# Patient Record
Sex: MALE | URBAN - METROPOLITAN AREA
[De-identification: names, ages, dates, MRNs, and addresses within clinical notes are randomized per-mention and may not be internally consistent; named-entity substitution may affect disease eponyms.]

---

## 2020-03-24 ENCOUNTER — INPATIENT (INPATIENT)
Facility: HOSPITAL | Age: 40
LOS: 9 days | Discharge: HOME | DRG: 177 | End: 2020-04-03
Attending: INTERNAL MEDICINE | Admitting: INTERNAL MEDICINE
Payer: MEDICAID

## 2020-03-24 VITALS
RESPIRATION RATE: 22 BRPM | SYSTOLIC BLOOD PRESSURE: 133 MMHG | TEMPERATURE: 103 F | HEART RATE: 87 BPM | DIASTOLIC BLOOD PRESSURE: 72 MMHG | OXYGEN SATURATION: 91 %

## 2020-03-24 DIAGNOSIS — R68.89 OTHER GENERAL SYMPTOMS AND SIGNS: ICD-10-CM

## 2020-03-24 DIAGNOSIS — Z98.890 OTHER SPECIFIED POSTPROCEDURAL STATES: Chronic | ICD-10-CM

## 2020-03-24 LAB
ALBUMIN SERPL ELPH-MCNC: 3.6 G/DL — SIGNIFICANT CHANGE UP (ref 3.3–5)
ALP SERPL-CCNC: 58 U/L — SIGNIFICANT CHANGE UP (ref 40–120)
ALT FLD-CCNC: 25 U/L — SIGNIFICANT CHANGE UP (ref 10–45)
ANION GAP SERPL CALC-SCNC: 15 MMOL/L — SIGNIFICANT CHANGE UP (ref 5–17)
APTT BLD: 35 SEC — SIGNIFICANT CHANGE UP (ref 27.5–36.3)
AST SERPL-CCNC: 33 U/L — SIGNIFICANT CHANGE UP (ref 10–40)
BASOPHILS # BLD AUTO: 0.01 K/UL — SIGNIFICANT CHANGE UP (ref 0–0.2)
BASOPHILS NFR BLD AUTO: 0.2 % — SIGNIFICANT CHANGE UP (ref 0–2)
BILIRUB SERPL-MCNC: 0.6 MG/DL — SIGNIFICANT CHANGE UP (ref 0.2–1.2)
BUN SERPL-MCNC: 12 MG/DL — SIGNIFICANT CHANGE UP (ref 7–23)
CALCIUM SERPL-MCNC: 8 MG/DL — LOW (ref 8.4–10.5)
CHLORIDE SERPL-SCNC: 101 MMOL/L — SIGNIFICANT CHANGE UP (ref 96–108)
CO2 SERPL-SCNC: 18 MMOL/L — LOW (ref 22–31)
CREAT SERPL-MCNC: 0.81 MG/DL — SIGNIFICANT CHANGE UP (ref 0.5–1.3)
EOSINOPHIL # BLD AUTO: 0.01 K/UL — SIGNIFICANT CHANGE UP (ref 0–0.5)
EOSINOPHIL NFR BLD AUTO: 0.2 % — SIGNIFICANT CHANGE UP (ref 0–6)
GLUCOSE SERPL-MCNC: 140 MG/DL — HIGH (ref 70–99)
HCT VFR BLD CALC: 38.1 % — LOW (ref 39–50)
HGB BLD-MCNC: 12.9 G/DL — LOW (ref 13–17)
IMM GRANULOCYTES NFR BLD AUTO: 0.4 % — SIGNIFICANT CHANGE UP (ref 0–1.5)
INR BLD: 1.29 RATIO — HIGH (ref 0.88–1.16)
LACTATE BLDV-MCNC: 1.7 MMOL/L — SIGNIFICANT CHANGE UP (ref 0.7–2)
LYMPHOCYTES # BLD AUTO: 0.5 K/UL — LOW (ref 1–3.3)
LYMPHOCYTES # BLD AUTO: 9.7 % — LOW (ref 13–44)
MCHC RBC-ENTMCNC: 30.6 PG — SIGNIFICANT CHANGE UP (ref 27–34)
MCHC RBC-ENTMCNC: 33.9 GM/DL — SIGNIFICANT CHANGE UP (ref 32–36)
MCV RBC AUTO: 90.3 FL — SIGNIFICANT CHANGE UP (ref 80–100)
MONOCYTES # BLD AUTO: 0.51 K/UL — SIGNIFICANT CHANGE UP (ref 0–0.9)
MONOCYTES NFR BLD AUTO: 9.8 % — SIGNIFICANT CHANGE UP (ref 2–14)
NEUTROPHILS # BLD AUTO: 4.13 K/UL — SIGNIFICANT CHANGE UP (ref 1.8–7.4)
NEUTROPHILS NFR BLD AUTO: 79.7 % — HIGH (ref 43–77)
NRBC # BLD: 0 /100 WBCS — SIGNIFICANT CHANGE UP (ref 0–0)
PLATELET # BLD AUTO: 123 K/UL — LOW (ref 150–400)
POTASSIUM SERPL-MCNC: 4 MMOL/L — SIGNIFICANT CHANGE UP (ref 3.5–5.3)
POTASSIUM SERPL-SCNC: 4 MMOL/L — SIGNIFICANT CHANGE UP (ref 3.5–5.3)
PROT SERPL-MCNC: 6.7 G/DL — SIGNIFICANT CHANGE UP (ref 6–8.3)
PROTHROM AB SERPL-ACNC: 15 SEC — HIGH (ref 10–12.9)
RBC # BLD: 4.22 M/UL — SIGNIFICANT CHANGE UP (ref 4.2–5.8)
RBC # FLD: 12.8 % — SIGNIFICANT CHANGE UP (ref 10.3–14.5)
SODIUM SERPL-SCNC: 134 MMOL/L — LOW (ref 135–145)
WBC # BLD: 5.18 K/UL — SIGNIFICANT CHANGE UP (ref 3.8–10.5)
WBC # FLD AUTO: 5.18 K/UL — SIGNIFICANT CHANGE UP (ref 3.8–10.5)

## 2020-03-24 PROCEDURE — 71250 CT THORAX DX C-: CPT | Mod: 26

## 2020-03-24 PROCEDURE — 93010 ELECTROCARDIOGRAM REPORT: CPT

## 2020-03-24 PROCEDURE — 99223 1ST HOSP IP/OBS HIGH 75: CPT

## 2020-03-24 PROCEDURE — 99285 EMERGENCY DEPT VISIT HI MDM: CPT

## 2020-03-24 PROCEDURE — 71045 X-RAY EXAM CHEST 1 VIEW: CPT | Mod: 26

## 2020-03-24 RX ORDER — ACETAMINOPHEN 500 MG
975 TABLET ORAL ONCE
Refills: 0 | Status: COMPLETED | OUTPATIENT
Start: 2020-03-24 | End: 2020-03-24

## 2020-03-24 RX ADMIN — Medication 975 MILLIGRAM(S): at 21:35

## 2020-03-24 NOTE — ED ADULT NURSE NOTE - NSFALLRSKINDICATORS_ED_ALL_ED
Emergency Department  [address] [facility] [address]  Phone:  [phone]  Fax:  [phone]                                    Ron Gilmore   MRN: [id number]    Department:   Emergency Department   Date of Visit:  6/27/2019           After Visit Summary Signature Page    I have received my discharge instructions, and my questions have been answered. I have discussed any challenges I see with this plan with the nurse or doctor.    ..........................................................................................................................................  Patient/Patient Representative Signature      ..........................................................................................................................................  Patient Representative Print Name and Relationship to Patient    ..................................................               ................................................  Date                                   Time    ..........................................................................................................................................  Reviewed by Signature/Title    ...................................................              ..............................................  Date                                               Time          22EPIC Rev 08/18       
no

## 2020-03-24 NOTE — ED ADULT NURSE NOTE - CHPI ED NUR SYMPTOMS NEG
no chest pain/no edema/no hemoptysis/no diaphoresis/no wheezing/no headache/no cough/no body aches/no chills

## 2020-03-24 NOTE — ED PROVIDER NOTE - CARE PLAN
Principal Discharge DX:	Fever Principal Discharge DX:	[facility]

## 2020-03-24 NOTE — H&P ADULT - NSHPSOCIALHISTORY_GEN_ALL_CORE
Lives with daughter, Laquita  Former smoker quit 3-4 months ago. Smoked since the age of 17 <1ppd  Denies EtOH  Denies illicit drug use

## 2020-03-24 NOTE — H&P ADULT - ATTENDING COMMENTS
Patient was previously unknown to me. Patient was assigned to me by hospitalist in charge. My involvement in this case consisted only of the initial history, physical and management plan. Primary medicine day team to assume care in AM and thereafter. Case discussed in detail with overnight medicine NP/DUNCAN Reddy [id number]

## 2020-03-24 NOTE — ED PROVIDER NOTE - ATTENDING CONTRIBUTION TO CARE
Attending MD Sheffield:   I personally have seen and examined this patient.  Physician assistant note reviewed and agree on plan of care and except where noted.  See below for details.     Seen in [facility]/[place] [facility], interviewed in [place]  Laquita, daughter, [phone], [place] speaking    79M with no reported PMH, PSH ventral hernia repair presents to the ED with fever for three weeks.  Denies cough, chest pain, abdominal pain, nausea, vomiting, diarrhea, urinary complaints.  Denies travel, known COVID contacts.  Reports quit tobacco 3 months ago.  Denies EtOH, denies drugs.  On exam, ill appearing, head NCAT, PERRL, ranging neck freely, lungs CTAB with good inspiratory effort,  +S1S2, no m/r/g, abdomen soft with +BS, NT, +well healed midline surgical scar in mid upper abdomen, no CVAT, moving all extremities with 5/5 strength; A/P: 79M with fever for three weeks, will proceed with infectious work up but given prevalence of COVID 19 at this time, will obtain labs, Cx, UA, UrCx, will swab for COVID, CXR, will give IVFs, Tylenol, reassess    Spoke with daughter, Laquita, who corroborates history given by patient, no other symptoms other than fever for three weeks Attending MD Sheffield:   I personally have seen and examined this patient.  Physician assistant note reviewed and agree on plan of care and except where noted.  See below for details.     Seen in [facility]/[place] 25, interviewed in [place]  Laquita, daughter, [phone], Citizen [place] speaking    79M with no reported PMH, PSH ventral hernia repair presents to the ED with fever for three weeks.  Denies cough, chest pain, abdominal pain, nausea, vomiting, diarrhea, urinary complaints.  Denies travel, known COVID contacts.  Reports quit tobacco 3 months ago.  Denies EtOH, denies drugs.  On exam, ill appearing, head NCAT, PERRL, ranging neck freely, lungs diffuse crackles with good inspiratory effort, sat'ing low 90s while resting in stretcher, no retractions, no accessory muscle use,+S1S2, no m/r/g, abdomen soft with +BS, NT, +well healed midline surgical scar in mid upper abdomen, no CVAT, moving all extremities with 5/5 strength; A/P: 79M with fever for three weeks, will proceed with infectious work up but given prevalence of COVID 19 at this time, will obtain labs, Cx, UA, UrCx, will swab for COVID, CXR, will give IVFs, Tylenol, reassess    Spoke with daughter, Laquita, who corroborates history given by patient, no other symptoms other than fever for three weeks

## 2020-03-24 NOTE — H&P ADULT - ASSESSMENT
80 yo man with no reported PMH presents from home in setting of 3 weeks of fever and malaise with CXR notable for diffuse bilateral opacities concerning for PNA

## 2020-03-24 NOTE — H&P ADULT - NSHPPHYSICALEXAM_GEN_ALL_CORE
Vital Signs Last 24 Hrs  T(C): 38.7 (24 Mar 2020 21:40), Max: 39.5 (24 Mar 2020 20:33)  T(F): 101.6 (24 Mar 2020 21:40), Max: 103.1 (24 Mar 2020 20:33)  HR: 80 (24 Mar 2020 23:33) (80 - 87)  BP: 119/65 (24 Mar 2020 23:33) (119/65 - 145/69)  BP(mean): --  RR: 20 (24 Mar 2020 23:33) (20 - 25)  SpO2: 94% (24 Mar 2020 23:33) (91% - 94%)      PHYSICAL EXAM:  GENERAL: NAD, well-groomed, well-developed  HEAD:  Atraumatic, Normocephalic  EYES: EOMI, PERRLA, conjunctiva and sclera clear  NECK: Supple, No JVD  NERVOUS SYSTEM:  Alert & Oriented X3, Good concentration; Motor Strength 5/5 B/L upper and lower extremities. Mild resting tremors  CHEST/LUNG: Speaking in full sentences, respirations non labored. Scant rales  HEART: Regular rate and rhythm +S1 S2; No murmurs, rubs, or gallops  ABDOMEN: Soft, Nontender, Nondistended; Bowel sounds present  EXTREMITIES:  2+ Peripheral Pulses, No clubbing, cyanosis, or edema  LYMPH: No cervical or supraclavicular lymphadenopathy noted  SKIN: Warm. No rashes or lesions
yes

## 2020-03-24 NOTE — ED PROVIDER NOTE - PHYSICAL EXAMINATION
GEN: Pt in NAD, A&O x3.  PSYCH: Affect appropriate.  EYES: Sclera white w/o injection.   ENT: Head NCAT. Nose w/o deformity. No auricular TTP. MMM, uvula midline. Neck supple FROM.   RESP: No chest wall tenderness, faint rales b/l no wheezes or rhonchi.  CARDIAC: RRR, clear distinct S1, S2, no appreciable murmurs.  ABD: Abdomen with midline vertical incisional scar. Abd soft, non-tender. No CVAT b/l.  VASC: 2+ radial and dorsalis pedis pulses b/l. No edema or calf tenderness.  SKIN: No rashes on the trunk. GEN: Pt in NAD, A&O x3.  PSYCH: Affect appropriate.  EYES: Sclera white w/o injection.   ENT: Head NCAT. Nose w/o deformity. No auricular TTP. MMM, uvula midline. Neck supple FROM, negative kernig and brudzinski.   RESP: No chest wall tenderness, faint rales b/l no wheezes or rhonchi.  CARDIAC: RRR, clear distinct S1, S2, no appreciable murmurs.  ABD: Abdomen with midline vertical incisional scar. Abd soft, non-tender. No CVAT b/l.  VASC: 2+ radial and dorsalis pedis pulses b/l. No edema or calf tenderness.  SKIN: No rashes on the trunk.

## 2020-03-24 NOTE — ED ADULT NURSE NOTE - OBJECTIVE STATEMENT
79y old male no PMH walk in from triage c/o fever. A&Ox? As per patients daughter, patient has had fevers for past two weeks, taken dayquil last dose taken this morning, daughter denies that patient has had fever/chills, n/v/d, SOB, CP, recent sick contacts at home, numbness/tingling, urinary s/s. 79y old male no PMH walk in from triage c/o fever. A&Ox? As per patients daughter, patient has had fevers for past three weeks, taken dayquil last dose taken this morning, daughter denies that patient has had fever/chills, n/v/d, SOB, CP, recent sick contacts at home, numbness/tingling, urinary s/s. Patient skin is warm and intact, skin on face is sloughing and red, airway patent, equal chest rise present, rales auscultated b/t, no edema present. IV inserted, medication administered as per MD order.

## 2020-03-24 NOTE — H&P ADULT - NSHPLABSRESULTS_GEN_ALL_CORE
Personally reviewed EKG: SR 82 bpm QTc 446 no ST segment changes    Personally reviewed labs and noted in detail below: lymphopenia    Personally reviewed imaging  < from: Xray Chest 1 View AP/PA (03.24.20 @ 22:42) >    ******PRELIMINARY REPORT******          INTERPRETATION:  Bilateral patchy opacities, likely representing multifocal pneumonia (differential considerations include viral pneumonia, such as COVID-19).    ******PRELIMINARY REPORT******      < end of copied text >

## 2020-03-24 NOTE — H&P ADULT - PROBLEM SELECTOR PLAN 1
Suspected COVID-19 in setting of lymphopenia and CXR findings  However must rule out other sources given chronicity of symptoms  Obtain UA and Urine culture  F/U Urine culture  Given CXR findings of PNA: empiric antibiotics with Ceftriaxone and Azithromycin

## 2020-03-24 NOTE — H&P ADULT - HISTORY OF PRESENT ILLNESS
78 yo man with no reported PMH presents from home in setting of 3 weeks of fever and malaise. Patient states that the fevers were not high. Intermittent sweats. Denies chills. Denies rhinorrhea, nasal congestion, sore throat, cough.  Denies CP, SOB, palpitations. Denies abdominal pain, nausea, emesis, diarrhea, dysuria. Denies sick contacts, but lives with daughter, Laquita, who has been having similar symptoms. Denies recent travel. Denies known COVID contacts. 78 yo man with no reported PMH presents from home in setting of 3 weeks of fever and malaise. Patient states that the fevers were not high. Intermittent sweats. Denies chills. Denies rhinorrhea, nasal congestion, sore throat, cough. Denies sensation of swollen glands along the neck.  Denies CP, SOB, palpitations. Denies abdominal pain, nausea, emesis, diarrhea, dysuria. Denies sick contacts, but lives with daughter, Laquita, who has been having similar symptoms. Denies recent travel. Denies known COVID contacts. Has been taking Tylenol and Dayquil

## 2020-03-24 NOTE — H&P ADULT - PROBLEM SELECTOR PLAN 2
- COVID19 PCR collected and is pending/negative with plan for infection control to determine retest/positive  - Chest imaging consistent with multifocal pneumonia  - Relative Hypoxia in the ED: Continue with supplemental oxygen with goal SpO2>92, if requires escalate to BiPAP and avoid BiPAP/High Flow Nasal Cannula per institution policy given risk of aerosolization  - empiric antibiotics for superimposed bacterial infection given due to clinical status  - obtain cbc with diff, CMP w/ lytes procalcitonin, ESR, CRP  - If COVID19 positive collect LDH, Ferritin, Lactate, T-cell subset, CK, G6PD  - monitor EKG for QTc prolongation  - isolation precautions - COVID19 PCR collected and is pending/negative with plan for infection control to determine retest/positive  - Chest imaging consistent with multifocal pneumonia  - Relative Hypoxia in the ED: Continue with supplemental oxygen with goal SpO2>92, if requires escalate to NRB and avoid BiPAP/High Flow Nasal Cannula per institution policy given risk of aerosolization  - empiric antibiotics for superimposed bacterial infection given due to clinical status  - obtain cbc with diff, CMP w/ lytes procalcitonin, ESR, CRP  - If COVID19 positive collect LDH, Ferritin, T-cell subset, CK, G6PD  - monitor EKG for QTc prolongation  - isolation precautions

## 2020-03-24 NOTE — H&P ADULT - NSHPREVIEWOFSYSTEMS_GEN_ALL_CORE
REVIEW OF SYSTEMS:  CONSTITUTIONAL: See HPI  EYES: No eye pain or discharge  ENMT:  See HPI  RESPIRATORY: No cough, wheezing, or shortness of breath  CARDIOVASCULAR: No chest pain, palpitations, or leg swelling  GASTROINTESTINAL: No abdominal pain, nausea, vomiting, diarrhea or constipation. No melena or hematochezia.  GENITOURINARY: No dysuria, frequency, hematuria, or incontinence  NEUROLOGICAL: No headaches, loss of strength, numbness, or tremors  SKIN: +Dry skin  LYMPH NODES: No enlarged glands  MUSCULOSKELETAL: No joint pain or swelling; No muscle, back, or extremity pain  ALLERGY AND IMMUNOLOGIC: No reaction to medications

## 2020-03-24 NOTE — ED ADULT NURSE NOTE - NURSING NEURO LEVEL OF CONSCIOUSNESS
This was the same medication she was on before, I'm not sure why her copay is now higher. Unfortunately, most combo pills tend to be more expensive because they are more convenient. She can discuss with her pharmacist if there are combo pills that her insurance will cover.  I am recommending she takes the individual pills in the meantime which should be cheaper- please confirm with her pharmacy that these are covered. Her blood pressure was well controlled on losartan- HCTZ. Scripts are pending.    alert and awake

## 2020-03-24 NOTE — ED PROVIDER NOTE - PROGRESS NOTE DETAILS
This patient is a non [facility] employee and seen in the Emergency department.   Staff did use appropriate PPE.

## 2020-03-25 DIAGNOSIS — R68.89 OTHER GENERAL SYMPTOMS AND SIGNS: ICD-10-CM

## 2020-03-25 DIAGNOSIS — Z29.9 ENCOUNTER FOR PROPHYLACTIC MEASURES, UNSPECIFIED: ICD-10-CM

## 2020-03-25 DIAGNOSIS — Z02.9 ENCOUNTER FOR ADMINISTRATIVE EXAMINATIONS, UNSPECIFIED: ICD-10-CM

## 2020-03-25 DIAGNOSIS — R50.9 FEVER, UNSPECIFIED: ICD-10-CM

## 2020-03-25 DIAGNOSIS — J96.01 ACUTE RESPIRATORY FAILURE WITH HYPOXIA: ICD-10-CM

## 2020-03-25 DIAGNOSIS — B34.2 CORONAVIRUS INFECTION, UNSPECIFIED: ICD-10-CM

## 2020-03-25 LAB
APPEARANCE UR: CLEAR — SIGNIFICANT CHANGE UP
BILIRUB UR-MCNC: NEGATIVE — SIGNIFICANT CHANGE UP
CK SERPL-CCNC: 141 U/L — SIGNIFICANT CHANGE UP (ref 30–200)
COLOR SPEC: YELLOW — SIGNIFICANT CHANGE UP
D DIMER BLD IA.RAPID-MCNC: 226 NG/ML DDU — SIGNIFICANT CHANGE UP
DIFF PNL FLD: NEGATIVE — SIGNIFICANT CHANGE UP
FERRITIN SERPL-MCNC: 888 NG/ML — HIGH (ref 30–400)
GLUCOSE UR QL: NEGATIVE — SIGNIFICANT CHANGE UP
KETONES UR-MCNC: NEGATIVE — SIGNIFICANT CHANGE UP
LDH SERPL L TO P-CCNC: 298 U/L — HIGH (ref 50–242)
LEUKOCYTE ESTERASE UR-ACNC: NEGATIVE — SIGNIFICANT CHANGE UP
NITRITE UR-MCNC: NEGATIVE — SIGNIFICANT CHANGE UP
PH UR: 6.5 — SIGNIFICANT CHANGE UP (ref 5–8)
PROT UR-MCNC: ABNORMAL
SARS-COV-2 RNA SPEC QL NAA+PROBE: DETECTED
SP GR SPEC: 1.03 — HIGH (ref 1.01–1.02)
UROBILINOGEN FLD QL: NEGATIVE — SIGNIFICANT CHANGE UP

## 2020-03-25 PROCEDURE — 99233 SBSQ HOSP IP/OBS HIGH 50: CPT

## 2020-03-25 RX ORDER — ALBUTEROL 90 UG/1
2 AEROSOL, METERED ORAL EVERY 6 HOURS
Refills: 0 | Status: DISCONTINUED | OUTPATIENT
Start: 2020-03-25 | End: 2020-03-29

## 2020-03-25 RX ORDER — INFLUENZA VIRUS VACCINE 15; 15; 15; 15 UG/.5ML; UG/.5ML; UG/.5ML; UG/.5ML
0.5 SUSPENSION INTRAMUSCULAR ONCE
Refills: 0 | Status: DISCONTINUED | OUTPATIENT
Start: 2020-03-25 | End: 2020-04-03

## 2020-03-25 RX ORDER — CEFTRIAXONE 500 MG/1
1000 INJECTION, POWDER, FOR SOLUTION INTRAMUSCULAR; INTRAVENOUS ONCE
Refills: 0 | Status: COMPLETED | OUTPATIENT
Start: 2020-03-25 | End: 2020-03-25

## 2020-03-25 RX ORDER — ENOXAPARIN SODIUM 100 MG/ML
40 INJECTION SUBCUTANEOUS EVERY 24 HOURS
Refills: 0 | Status: DISCONTINUED | OUTPATIENT
Start: 2020-03-25 | End: 2020-04-03

## 2020-03-25 RX ORDER — AZITHROMYCIN 500 MG/1
TABLET, FILM COATED ORAL
Refills: 0 | Status: DISCONTINUED | OUTPATIENT
Start: 2020-03-25 | End: 2020-03-25

## 2020-03-25 RX ORDER — AZITHROMYCIN 500 MG/1
500 TABLET, FILM COATED ORAL ONCE
Refills: 0 | Status: COMPLETED | OUTPATIENT
Start: 2020-03-25 | End: 2020-03-25

## 2020-03-25 RX ORDER — CEFTRIAXONE 500 MG/1
INJECTION, POWDER, FOR SOLUTION INTRAMUSCULAR; INTRAVENOUS
Refills: 0 | Status: DISCONTINUED | OUTPATIENT
Start: 2020-03-25 | End: 2020-03-25

## 2020-03-25 RX ORDER — AZITHROMYCIN 500 MG/1
500 TABLET, FILM COATED ORAL DAILY
Refills: 0 | Status: DISCONTINUED | OUTPATIENT
Start: 2020-03-25 | End: 2020-03-26

## 2020-03-25 RX ORDER — ACETAMINOPHEN 500 MG
650 TABLET ORAL EVERY 6 HOURS
Refills: 0 | Status: DISCONTINUED | OUTPATIENT
Start: 2020-03-25 | End: 2020-04-03

## 2020-03-25 RX ADMIN — Medication 650 MILLIGRAM(S): at 10:31

## 2020-03-25 RX ADMIN — CEFTRIAXONE 100 MILLIGRAM(S): 500 INJECTION, POWDER, FOR SOLUTION INTRAMUSCULAR; INTRAVENOUS at 02:20

## 2020-03-25 RX ADMIN — AZITHROMYCIN 250 MILLIGRAM(S): 500 TABLET, FILM COATED ORAL at 01:00

## 2020-03-25 RX ADMIN — ENOXAPARIN SODIUM 40 MILLIGRAM(S): 100 INJECTION SUBCUTANEOUS at 12:22

## 2020-03-25 NOTE — PROGRESS NOTE ADULT - PROBLEM SELECTOR PLAN 1
Covid-19 POS   eligible for study - f/u ID  d/c antibiotics - first ctx, then azithro once alternative mgmt identified - monitor QTc while on azithro  start plaquenil if otherwise not enrolled in a study  O2 mgmt as below - wean as tolerated Covid-19 POS   eligible for study - f/u ID  d/c antibiotics - first ctx, then azithro once alternative mgmt identified - monitor QTc while on azithro  start plaquenil if otherwise not enrolled in a study  O2 mgmt as below - wean as tolerated    f/u G6PD but can start plaquenil if recommended  trend inflammatory markers q48hrs

## 2020-03-25 NOTE — PROGRESS NOTE ADULT - SUBJECTIVE AND OBJECTIVE BOX
[facility] Division of Hospital Medicine  Salazar Dewey MD  Pager (M-F, 8A-5P): [phone]  Other Times:  [phone]    Patient is a 79y old  Male who presents with a chief complaint of fever (24 Mar 2020 23:26)    SUBJECTIVE / OVERNIGHT EVENTS:   ADDITIONAL REVIEW OF SYSTEMS:    MEDICATIONS  (STANDING):  azithromycin  IVPB      cefTRIAXone   IVPB      enoxaparin Injectable 40 milliGRAM(s) SubCutaneous every 24 hours  influenza   Vaccine 0.5 milliLiter(s) IntraMuscular once    MEDICATIONS  (PRN):  acetaminophen   Tablet .. 650 milliGRAM(s) Oral every 6 hours PRN Temp greater or equal to 38C (100.4F), Mild Pain (1 - 3)  ALBUTerol    90 MICROgram(s) HFA Inhaler 2 Puff(s) Inhalation every 6 hours PRN Shortness of Breath and/or Wheezing      CAPILLARY BLOOD GLUCOSE    I&O's Summary    24 Mar 2020 07:01  -  25 Mar 2020 07:00  --------------------------------------------------------  IN: 0 mL / OUT: 300 mL / NET: -300 mL    25 Mar 2020 07:01  -  25 Mar 2020 15:48  --------------------------------------------------------  IN: 480 mL / OUT: 250 mL / NET: 230 mL        PHYSICAL EXAM:  Vital Signs Last 24 Hrs  T(C): 37.2 (25 Mar 2020 14:15), Max: 39.5 (24 Mar 2020 20:33)  T(F): 98.9 (25 Mar 2020 14:15), Max: 103.1 (24 Mar 2020 20:33)  HR: 84 (25 Mar 2020 13:07) (55 - 87)  BP: 126/67 (25 Mar 2020 13:07) (111/61 - 145/69)  BP(mean): 77 (25 Mar 2020 02:47) (77 - 77)  RR: 18 (25 Mar 2020 13:07) (18 - 25)  SpO2: 94% (25 Mar 2020 13:07) (91% - 96%)  CONSTITUTIONAL: NAD, well-developed, well-groomed  EYES: conjunctiva and sclera clear  ENMT: Moist oral mucosa, no pharyngeal injection or exudates; normal dentition  NECK: Supple, no palpable masses; no thyromegaly  RESPIRATORY: Normal respiratory effort; lungs are clear to auscultation bilaterally  CARDIOVASCULAR: Regular rate and rhythm, no murmur; No lower extremity edema; Peripheral pulses are 2+ bilaterally  ABDOMEN: Nontender to palpation, normoactive bowel sounds, no rebound/guarding; No hepatosplenomegaly  PSYCH: calm  NEUROLOGY: AOx3 nonfocal  SKIN: No rashes; no palpable lesions    LABS:                        12.9   5.18  )-----------( 123      ( 24 Mar 2020 21:47 )             38.1     03-24    134<L>  |  101  |  12  ----------------------------<  140<H>  4.0   |  18<L>  |  0.81    Ca    8.0<L>      24 Mar 2020 21:47    TPro  6.7  /  Alb  3.6  /  TBili  0.6  /  DBili  x   /  AST  33  /  ALT  25  /  AlkPhos  58  03-24    PT/INR - ( 24 Mar 2020 21:47 )   PT: 15.0 sec;   INR: 1.29 ratio         PTT - ( 24 Mar 2020 21:47 )  PTT:35.0 sec  CARDIAC MARKERS ( 25 Mar 2020 06:29 )  x     / x     / 141 U/L / x     / x          Urinalysis Basic - ( 25 Mar 2020 12:33 )    Color: Yellow / Appearance: Clear / S.027 / pH: x  Gluc: x / Ketone: Negative  / Bili: Negative / Urobili: Negative   Blood: x / Protein: 30 mg/dL / Nitrite: Negative   Leuk Esterase: Negative / RBC: 1 /hpf / WBC 0 /HPF   Sq Epi: x / Non Sq Epi: 1 / Bacteria: Negative          RADIOLOGY & ADDITIONAL TESTS:  Results Reviewed:   Imaging Personally Reviewed:  Electrocardiogram Personally Reviewed:    COORDINATION OF CARE:  Care Discussed with Consultants/Other Providers [Y/N]:  Prior or Outpatient Records Reviewed [Y/N]: [facility] Division of Hospital Medicine  Salazar Dewey MD  Pager (M-F, 8A-5P): [phone]  Other Times:  [phone]   phone declined  Patient is a 79y old  Male who presents with a chief complaint of fever (24 Mar 2020 23:26)    SUBJECTIVE / OVERNIGHT EVENTS: some dyspnea. minimal other complaints. poor appetite.   ADDITIONAL REVIEW OF SYSTEMS:    MEDICATIONS  (STANDING):  azithromycin  IVPB      cefTRIAXone   IVPB      enoxaparin Injectable 40 milliGRAM(s) SubCutaneous every 24 hours  influenza   Vaccine 0.5 milliLiter(s) IntraMuscular once    MEDICATIONS  (PRN):  acetaminophen   Tablet .. 650 milliGRAM(s) Oral every 6 hours PRN Temp greater or equal to 38C (100.4F), Mild Pain (1 - 3)  ALBUTerol    90 MICROgram(s) HFA Inhaler 2 Puff(s) Inhalation every 6 hours PRN Shortness of Breath and/or Wheezing      CAPILLARY BLOOD GLUCOSE    I&O's Summary    24 Mar 2020 07:  -  25 Mar 2020 07:00  --------------------------------------------------------  IN: 0 mL / OUT: 300 mL / NET: -300 mL    25 Mar 2020 07:01  -  25 Mar 2020 15:48  --------------------------------------------------------  IN: 480 mL / OUT: 250 mL / NET: 230 mL        PHYSICAL EXAM:  Vital Signs Last 24 Hrs  T(C): 37.2 (25 Mar 2020 14:15), Max: 39.5 (24 Mar 2020 20:33)  T(F): 98.9 (25 Mar 2020 14:15), Max: 103.1 (24 Mar 2020 20:33)  HR: 84 (25 Mar 2020 13:07) (55 - 87)  BP: 126/67 (25 Mar 2020 13:07) (111/61 - 145/69)  BP(mean): 77 (25 Mar 2020 02:47) (77 - 77)  RR: 18 (25 Mar 2020 13:07) (18 - 25)  SpO2: 94% (25 Mar 2020 13:07) (91% - 96%)  GENERAL: NAD, well-groomed, well-developed  NECK: Supple, No JVD  NERVOUS SYSTEM:  Alert & Oriented X3, Good concentration; Motor Strength 5/5 B/L upper and lower extremities. Mild resting tremors  CHEST/LUNG: Speaking in full sentences, respirations non labored. Scant rales  HEART: Regular rate and rhythm +S1 S2; No murmurs, rubs, or gallops  ABDOMEN: Soft, Nontender, Nondistended; Bowel sounds present  EXTREMITIES:  2+ Peripheral Pulses, No clubbing, cyanosis, or edema  SKIN: Warm. No rashes or lesions    LABS:                        12.9   5.18  )-----------( 123      ( 24 Mar 2020 21:47 )             38.1     03-24    134<L>  |  101  |  12  ----------------------------<  140<H>  4.0   |  18<L>  |  0.81    Ca    8.0<L>      24 Mar 2020 21:47    TPro  6.7  /  Alb  3.6  /  TBili  0.6  /  DBili  x   /  AST  33  /  ALT  25  /  AlkPhos  58  03-24    PT/INR - ( 24 Mar 2020 21:47 )   PT: 15.0 sec;   INR: 1.29 ratio         PTT - ( 24 Mar 2020 21:47 )  PTT:35.0 sec  CARDIAC MARKERS ( 25 Mar 2020 06:29 )  x     / x     / 141 U/L / x     / x          Urinalysis Basic - ( 25 Mar 2020 12:33 )    Color: Yellow / Appearance: Clear / S.027 / pH: x  Gluc: x / Ketone: Negative  / Bili: Negative / Urobili: Negative   Blood: x / Protein: 30 mg/dL / Nitrite: Negative   Leuk Esterase: Negative / RBC: 1 /hpf / WBC 0 /HPF   Sq Epi: x / Non Sq Epi: 1 / Bacteria: Negative    RADIOLOGY & ADDITIONAL TESTS:  Results Reviewed:   Imaging Personally Reviewed:   Electrocardiogram Personally Reviewed:    COORDINATION OF CARE:  Care Discussed with Consultants/Other Providers [Y/N]: HEIDI rhodes eligibility for research study and covid mgmt  Prior or Outpatient Records Reviewed [Y/N]:

## 2020-03-25 NOTE — PROGRESS NOTE ADULT - ASSESSMENT
80 yo man with no reported PMH presents from home in setting of 3 weeks of fever and malaise with CXR notable for diffuse bilateral opacities concerning for PNA Covid+

## 2020-03-26 LAB
ALBUMIN SERPL ELPH-MCNC: 3.3 G/DL — SIGNIFICANT CHANGE UP (ref 3.3–5)
ALP SERPL-CCNC: 57 U/L — SIGNIFICANT CHANGE UP (ref 40–120)
ALT FLD-CCNC: 21 U/L — SIGNIFICANT CHANGE UP (ref 10–45)
ANION GAP SERPL CALC-SCNC: 13 MMOL/L — SIGNIFICANT CHANGE UP (ref 5–17)
AST SERPL-CCNC: 33 U/L — SIGNIFICANT CHANGE UP (ref 10–40)
BASOPHILS # BLD AUTO: 0.01 K/UL — SIGNIFICANT CHANGE UP (ref 0–0.2)
BASOPHILS NFR BLD AUTO: 0.1 % — SIGNIFICANT CHANGE UP (ref 0–2)
BILIRUB SERPL-MCNC: 0.6 MG/DL — SIGNIFICANT CHANGE UP (ref 0.2–1.2)
BUN SERPL-MCNC: 15 MG/DL — SIGNIFICANT CHANGE UP (ref 7–23)
CALCIUM SERPL-MCNC: 8.1 MG/DL — LOW (ref 8.4–10.5)
CHLORIDE SERPL-SCNC: 99 MMOL/L — SIGNIFICANT CHANGE UP (ref 96–108)
CO2 SERPL-SCNC: 21 MMOL/L — LOW (ref 22–31)
CREAT SERPL-MCNC: 0.75 MG/DL — SIGNIFICANT CHANGE UP (ref 0.5–1.3)
CRP SERPL-MCNC: 9.35 MG/DL — HIGH (ref 0–0.4)
CULTURE RESULTS: SIGNIFICANT CHANGE UP
EOSINOPHIL # BLD AUTO: 0.01 K/UL — SIGNIFICANT CHANGE UP (ref 0–0.5)
EOSINOPHIL NFR BLD AUTO: 0.1 % — SIGNIFICANT CHANGE UP (ref 0–6)
ERYTHROCYTE [SEDIMENTATION RATE] IN BLOOD: 81 MM/HR — HIGH (ref 0–20)
GLUCOSE SERPL-MCNC: 124 MG/DL — HIGH (ref 70–99)
HCT VFR BLD CALC: 35.1 % — LOW (ref 39–50)
HGB BLD-MCNC: 11.9 G/DL — LOW (ref 13–17)
IMM GRANULOCYTES NFR BLD AUTO: 0.7 % — SIGNIFICANT CHANGE UP (ref 0–1.5)
LYMPHOCYTES # BLD AUTO: 0.66 K/UL — LOW (ref 1–3.3)
LYMPHOCYTES # BLD AUTO: 9 % — LOW (ref 13–44)
MAGNESIUM SERPL-MCNC: 2.2 MG/DL — SIGNIFICANT CHANGE UP (ref 1.6–2.6)
MCHC RBC-ENTMCNC: 31.2 PG — SIGNIFICANT CHANGE UP (ref 27–34)
MCHC RBC-ENTMCNC: 33.9 GM/DL — SIGNIFICANT CHANGE UP (ref 32–36)
MCV RBC AUTO: 91.9 FL — SIGNIFICANT CHANGE UP (ref 80–100)
MONOCYTES # BLD AUTO: 0.44 K/UL — SIGNIFICANT CHANGE UP (ref 0–0.9)
MONOCYTES NFR BLD AUTO: 6 % — SIGNIFICANT CHANGE UP (ref 2–14)
NEUTROPHILS # BLD AUTO: 6.14 K/UL — SIGNIFICANT CHANGE UP (ref 1.8–7.4)
NEUTROPHILS NFR BLD AUTO: 84.1 % — HIGH (ref 43–77)
NRBC # BLD: 0 /100 WBCS — SIGNIFICANT CHANGE UP (ref 0–0)
PHOSPHATE SERPL-MCNC: 3.5 MG/DL — SIGNIFICANT CHANGE UP (ref 2.5–4.5)
PLATELET # BLD AUTO: 143 K/UL — LOW (ref 150–400)
POTASSIUM SERPL-MCNC: 4 MMOL/L — SIGNIFICANT CHANGE UP (ref 3.5–5.3)
POTASSIUM SERPL-SCNC: 4 MMOL/L — SIGNIFICANT CHANGE UP (ref 3.5–5.3)
PROCALCITONIN SERPL-MCNC: 0.64 NG/ML — HIGH (ref 0.02–0.1)
PROT SERPL-MCNC: 6.4 G/DL — SIGNIFICANT CHANGE UP (ref 6–8.3)
RBC # BLD: 3.82 M/UL — LOW (ref 4.2–5.8)
RBC # FLD: 13 % — SIGNIFICANT CHANGE UP (ref 10.3–14.5)
SODIUM SERPL-SCNC: 133 MMOL/L — LOW (ref 135–145)
SPECIMEN SOURCE: SIGNIFICANT CHANGE UP
WBC # BLD: 7.31 K/UL — SIGNIFICANT CHANGE UP (ref 3.8–10.5)
WBC # FLD AUTO: 7.31 K/UL — SIGNIFICANT CHANGE UP (ref 3.8–10.5)

## 2020-03-26 PROCEDURE — 93010 ELECTROCARDIOGRAM REPORT: CPT

## 2020-03-26 PROCEDURE — 99233 SBSQ HOSP IP/OBS HIGH 50: CPT

## 2020-03-26 RX ORDER — HYDROXYCHLOROQUINE SULFATE 200 MG
400 TABLET ORAL EVERY 12 HOURS
Refills: 0 | Status: COMPLETED | OUTPATIENT
Start: 2020-03-26 | End: 2020-03-27

## 2020-03-26 RX ORDER — HYDROXYCHLOROQUINE SULFATE 200 MG
200 TABLET ORAL EVERY 12 HOURS
Refills: 0 | Status: COMPLETED | OUTPATIENT
Start: 2020-03-27 | End: 2020-03-31

## 2020-03-26 RX ORDER — HYDROXYCHLOROQUINE SULFATE 200 MG
TABLET ORAL
Refills: 0 | Status: COMPLETED | OUTPATIENT
Start: 2020-03-26 | End: 2020-03-31

## 2020-03-26 RX ADMIN — Medication 650 MILLIGRAM(S): at 02:45

## 2020-03-26 RX ADMIN — Medication 650 MILLIGRAM(S): at 16:46

## 2020-03-26 RX ADMIN — Medication 650 MILLIGRAM(S): at 01:06

## 2020-03-26 RX ADMIN — Medication 650 MILLIGRAM(S): at 16:16

## 2020-03-26 RX ADMIN — Medication 400 MILLIGRAM(S): at 17:49

## 2020-03-26 RX ADMIN — Medication 650 MILLIGRAM(S): at 08:41

## 2020-03-26 RX ADMIN — AZITHROMYCIN 500 MILLIGRAM(S): 500 TABLET, FILM COATED ORAL at 11:16

## 2020-03-26 RX ADMIN — ENOXAPARIN SODIUM 40 MILLIGRAM(S): 100 INJECTION SUBCUTANEOUS at 11:17

## 2020-03-26 RX ADMIN — Medication 650 MILLIGRAM(S): at 09:11

## 2020-03-26 NOTE — PROGRESS NOTE ADULT - PROBLEM SELECTOR PLAN 1
Covid-19 POS   eligible for study - f/u ID  d/c antibiotics - first ctx, then azithro once alternative mgmt identified - monitor QTc while on azithro  start plaquenil if otherwise not enrolled in a study  O2 mgmt as below - wean as tolerated    f/u G6PD but can start plaquenil if recommended  trend inflammatory markers q48hrs

## 2020-03-26 NOTE — PROVIDER CONTACT NOTE (OTHER) - ACTION/TREATMENT ORDERED:
DUNCAN Burleson notified and made aware. Tylenol given, ice packs provided, will continue to monitor.

## 2020-03-26 NOTE — PROGRESS NOTE ADULT - SUBJECTIVE AND OBJECTIVE BOX
[facility] Division of Hospital Medicine  Salazar Dewey MD  Pager (M-F, 8A-5P): [phone]  Other Times:  [phone]   declined  Patient is a 79y old  Male who presents with a chief complaint of fever (25 Mar 2020 15:48)    SUBJECTIVE / OVERNIGHT EVENTS: pt states he feels a little better. eating more. dyspnea stable/improving.   ADDITIONAL REVIEW OF SYSTEMS:    MEDICATIONS  (STANDING):  azithromycin   Tablet 500 milliGRAM(s) Oral daily  enoxaparin Injectable 40 milliGRAM(s) SubCutaneous every 24 hours  influenza   Vaccine 0.5 milliLiter(s) IntraMuscular once    MEDICATIONS  (PRN):  acetaminophen   Tablet .. 650 milliGRAM(s) Oral every 6 hours PRN Temp greater or equal to 38C (100.4F), Mild Pain (1 - 3)  ALBUTerol    90 MICROgram(s) HFA Inhaler 2 Puff(s) Inhalation every 6 hours PRN Shortness of Breath and/or Wheezing      CAPILLARY BLOOD GLUCOSE        I&O's Summary    25 Mar 2020 07:01  -  26 Mar 2020 07:00  --------------------------------------------------------  IN: 880 mL / OUT: 900 mL / NET: -20 mL    26 Mar 2020 07:01  -  26 Mar 2020 15:03  --------------------------------------------------------  IN: 480 mL / OUT: 250 mL / NET: 230 mL        PHYSICAL EXAM:  Vital Signs Last 24 Hrs  T(C): 37.3 (26 Mar 2020 13:55), Max: 38.8 (26 Mar 2020 00:55)  T(F): 99.1 (26 Mar 2020 13:55), Max: 101.8 (26 Mar 2020 00:55)  HR: 77 (26 Mar 2020 13:55) (64 - 90)  BP: 129/71 (26 Mar 2020 13:55) (115/63 - 154/60)  BP(mean): --  RR: 18 (26 Mar 2020 13:55) (18 - 19)  SpO2: 93% (26 Mar 2020 13:55) (93% - 96%)  GENERAL: NAD, well-groomed, well-developed  NECK: Supple, No JVD  NERVOUS SYSTEM:  Alert & Oriented X3, Good concentration; Motor Strength 5/5 B/L upper and lower extremities. Mild resting tremors  CHEST/LUNG: Speaking in full sentences, respirations non labored. Scant rales  HEART: Regular rate and rhythm +S1 S2; No murmurs, rubs, or gallops  ABDOMEN: Soft, Nontender, Nondistended; Bowel sounds present  EXTREMITIES:  2+ Peripheral Pulses, No clubbing, cyanosis, or edema  SKIN: Warm. No rashes or lesions    LABS:                        11.9   7.31  )-----------( 143      ( 26 Mar 2020 06:49 )             35.1     03-26    133<L>  |  99  |  15  ----------------------------<  124<H>  4.0   |  21<L>  |  0.75    Ca    8.1<L>      26 Mar 2020 06:49  Phos  3.5     03-26  Mg     2.2     03-26    TPro  6.4  /  Alb  3.3  /  TBili  0.6  /  DBili  x   /  AST  33  /  ALT  21  /  AlkPhos  57  03-26    PT/INR - ( 24 Mar 2020 21:47 )   PT: 15.0 sec;   INR: 1.29 ratio         PTT - ( 24 Mar 2020 21:47 )  PTT:35.0 sec  CARDIAC MARKERS ( 25 Mar 2020 06:29 )  x     / x     / 141 U/L / x     / x          Urinalysis Basic - ( 25 Mar 2020 12:33 )    Color: Yellow / Appearance: Clear / S.027 / pH: x  Gluc: x / Ketone: Negative  / Bili: Negative / Urobili: Negative   Blood: x / Protein: 30 mg/dL / Nitrite: Negative   Leuk Esterase: Negative / RBC: 1 /hpf / WBC 0 /HPF   Sq Epi: x / Non Sq Epi: 1 / Bacteria: Negative        Culture - Urine (collected 25 Mar 2020 15:19)  Source: .Urine Clean Catch (Midstream)  Final Report (26 Mar 2020 11:35):    <10,000 CFU/mL Normal Urogenital Randi    Culture - Blood (collected 25 Mar 2020 00:46)  Source: .Blood Blood-Peripheral  Preliminary Report (26 Mar 2020 01:02):    No growth to date.    Culture - Blood (collected 25 Mar 2020 00:46)  Source: .Blood Blood-Peripheral  Preliminary Report (26 Mar 2020 01:02):    No growth to date.      RADIOLOGY & ADDITIONAL TESTS:  Results Reviewed:   Imaging Personally Reviewed:  Electrocardiogram Personally Reviewed:    COORDINATION OF CARE:  Care Discussed with Consultants/Other Providers [Y/N]: ID Dr Madonna rhodes possible enrollment in study  Prior or Outpatient Records Reviewed [Y/N]:

## 2020-03-27 LAB
ANION GAP SERPL CALC-SCNC: 15 MMOL/L — SIGNIFICANT CHANGE UP (ref 5–17)
BASOPHILS # BLD AUTO: 0.02 K/UL — SIGNIFICANT CHANGE UP (ref 0–0.2)
BASOPHILS NFR BLD AUTO: 0.3 % — SIGNIFICANT CHANGE UP (ref 0–2)
BUN SERPL-MCNC: 13 MG/DL — SIGNIFICANT CHANGE UP (ref 7–23)
CALCIUM SERPL-MCNC: 7.9 MG/DL — LOW (ref 8.4–10.5)
CHLORIDE SERPL-SCNC: 100 MMOL/L — SIGNIFICANT CHANGE UP (ref 96–108)
CO2 SERPL-SCNC: 18 MMOL/L — LOW (ref 22–31)
CREAT SERPL-MCNC: 0.7 MG/DL — SIGNIFICANT CHANGE UP (ref 0.5–1.3)
EOSINOPHIL # BLD AUTO: 0.03 K/UL — SIGNIFICANT CHANGE UP (ref 0–0.5)
EOSINOPHIL NFR BLD AUTO: 0.4 % — SIGNIFICANT CHANGE UP (ref 0–6)
G6PD RBC-CCNC: 13.9 U/G HGB — SIGNIFICANT CHANGE UP (ref 7–20.5)
GLUCOSE SERPL-MCNC: 101 MG/DL — HIGH (ref 70–99)
HCT VFR BLD CALC: 33.9 % — LOW (ref 39–50)
HGB BLD-MCNC: 11.7 G/DL — LOW (ref 13–17)
IMM GRANULOCYTES NFR BLD AUTO: 0.7 % — SIGNIFICANT CHANGE UP (ref 0–1.5)
LYMPHOCYTES # BLD AUTO: 0.72 K/UL — LOW (ref 1–3.3)
LYMPHOCYTES # BLD AUTO: 10.2 % — LOW (ref 13–44)
MCHC RBC-ENTMCNC: 31.2 PG — SIGNIFICANT CHANGE UP (ref 27–34)
MCHC RBC-ENTMCNC: 34.5 GM/DL — SIGNIFICANT CHANGE UP (ref 32–36)
MCV RBC AUTO: 90.4 FL — SIGNIFICANT CHANGE UP (ref 80–100)
MONOCYTES # BLD AUTO: 0.39 K/UL — SIGNIFICANT CHANGE UP (ref 0–0.9)
MONOCYTES NFR BLD AUTO: 5.5 % — SIGNIFICANT CHANGE UP (ref 2–14)
NEUTROPHILS # BLD AUTO: 5.85 K/UL — SIGNIFICANT CHANGE UP (ref 1.8–7.4)
NEUTROPHILS NFR BLD AUTO: 82.9 % — HIGH (ref 43–77)
NRBC # BLD: 0 /100 WBCS — SIGNIFICANT CHANGE UP (ref 0–0)
PLATELET # BLD AUTO: 156 K/UL — SIGNIFICANT CHANGE UP (ref 150–400)
POTASSIUM SERPL-MCNC: 3.7 MMOL/L — SIGNIFICANT CHANGE UP (ref 3.5–5.3)
POTASSIUM SERPL-SCNC: 3.7 MMOL/L — SIGNIFICANT CHANGE UP (ref 3.5–5.3)
RBC # BLD: 3.75 M/UL — LOW (ref 4.2–5.8)
RBC # FLD: 13.1 % — SIGNIFICANT CHANGE UP (ref 10.3–14.5)
SODIUM SERPL-SCNC: 133 MMOL/L — LOW (ref 135–145)
WBC # BLD: 7.06 K/UL — SIGNIFICANT CHANGE UP (ref 3.8–10.5)
WBC # FLD AUTO: 7.06 K/UL — SIGNIFICANT CHANGE UP (ref 3.8–10.5)

## 2020-03-27 PROCEDURE — 99233 SBSQ HOSP IP/OBS HIGH 50: CPT

## 2020-03-27 RX ORDER — ALBUTEROL 90 UG/1
1 AEROSOL, METERED ORAL ONCE
Refills: 0 | Status: COMPLETED | OUTPATIENT
Start: 2020-03-27 | End: 2020-03-27

## 2020-03-27 RX ADMIN — Medication 650 MILLIGRAM(S): at 13:50

## 2020-03-27 RX ADMIN — Medication 650 MILLIGRAM(S): at 02:00

## 2020-03-27 RX ADMIN — Medication 650 MILLIGRAM(S): at 03:07

## 2020-03-27 RX ADMIN — Medication 400 MILLIGRAM(S): at 05:36

## 2020-03-27 RX ADMIN — ENOXAPARIN SODIUM 40 MILLIGRAM(S): 100 INJECTION SUBCUTANEOUS at 11:52

## 2020-03-27 RX ADMIN — Medication 200 MILLIGRAM(S): at 18:19

## 2020-03-27 RX ADMIN — Medication 650 MILLIGRAM(S): at 23:42

## 2020-03-27 RX ADMIN — ALBUTEROL 1 PUFF(S): 90 AEROSOL, METERED ORAL at 23:42

## 2020-03-27 NOTE — PROGRESS NOTE ADULT - SUBJECTIVE AND OBJECTIVE BOX
[facility] Division of Hospital Medicine  Salazar Dewey MD  Pager (M-F, 8A-5P): [phone]  Other Times:  [phone]    Patient is a 79y old  Male who presents with a chief complaint of fever (26 Mar 2020 15:02)     declined  SUBJECTIVE / OVERNIGHT EVENTS: no new complaints. comfortable on o2  ADDITIONAL REVIEW OF SYSTEMS:    MEDICATIONS  (STANDING):  enoxaparin Injectable 40 milliGRAM(s) SubCutaneous every 24 hours  hydroxychloroquine   Oral   hydroxychloroquine 200 milliGRAM(s) Oral every 12 hours  influenza   Vaccine 0.5 milliLiter(s) IntraMuscular once    MEDICATIONS  (PRN):  acetaminophen   Tablet .. 650 milliGRAM(s) Oral every 6 hours PRN Temp greater or equal to 38C (100.4F), Mild Pain (1 - 3)  ALBUTerol    90 MICROgram(s) HFA Inhaler 2 Puff(s) Inhalation every 6 hours PRN Shortness of Breath and/or Wheezing      CAPILLARY BLOOD GLUCOSE        I&O's Summary    26 Mar 2020 07:01  -  27 Mar 2020 07:00  --------------------------------------------------------  IN: 960 mL / OUT: 600 mL / NET: 360 mL    27 Mar 2020 07:01  -  27 Mar 2020 15:10  --------------------------------------------------------  IN: 480 mL / OUT: 500 mL / NET: -20 mL        PHYSICAL EXAM:  Vital Signs Last 24 Hrs  T(C): 38.9 (27 Mar 2020 13:39), Max: 38.9 (26 Mar 2020 16:35)  T(F): 102 (27 Mar 2020 13:39), Max: 102 (26 Mar 2020 16:35)  HR: 77 (27 Mar 2020 13:39) (77 - 81)  BP: 143/62 (27 Mar 2020 13:39) (113/56 - 151/70)  BP(mean): --  RR: 18 (27 Mar 2020 13:39) (17 - 20)  SpO2: 92% (27 Mar 2020 13:39) (90% - 94%)  GENERAL: NAD, well-groomed, well-developed  NECK: Supple, No JVD  NERVOUS SYSTEM:  Alert & Oriented X3, Good concentration; Motor Strength 5/5 B/L upper and lower extremities. Mild resting tremors  CHEST/LUNG: Speaking in full sentences, respirations non labored. Scant rales  HEART: Regular rate and rhythm +S1 S2; No murmurs, rubs, or gallops  ABDOMEN: Soft, Nontender, Nondistended; Bowel sounds present  EXTREMITIES:  2+ Peripheral Pulses, No clubbing, cyanosis, or edema  SKIN: Warm. No rashes or lesions    LABS:                        11.7   7.06  )-----------( 156      ( 27 Mar 2020 07:46 )             33.9     03-27    133<L>  |  100  |  13  ----------------------------<  101<H>  3.7   |  18<L>  |  0.70    Ca    7.9<L>      27 Mar 2020 07:46  Phos  3.5     03-26  Mg     2.2     03-26    TPro  6.4  /  Alb  3.3  /  TBili  0.6  /  DBili  x   /  AST  33  /  ALT  21  /  AlkPhos  57  03-26      Culture - Urine (collected 25 Mar 2020 15:19)  Source: .Urine Clean Catch (Midstream)  Final Report (26 Mar 2020 11:35):    <10,000 CFU/mL Normal Urogenital Randi    Culture - Blood (collected 25 Mar 2020 00:46)  Source: .Blood Blood-Peripheral  Preliminary Report (26 Mar 2020 01:02):    No growth to date.    Culture - Blood (collected 25 Mar 2020 00:46)  Source: .Blood Blood-Peripheral  Preliminary Report (26 Mar 2020 01:02):    No growth to date.        RADIOLOGY & ADDITIONAL TESTS:  Results Reviewed:   Imaging Personally Reviewed:  Electrocardiogram Personally Reviewed:    COORDINATION OF CARE:  Care Discussed with Consultants/Other Providers [Y/N]:  Prior or Outpatient Records Reviewed [Y/N]:

## 2020-03-27 NOTE — PROGRESS NOTE ADULT - ASSESSMENT
78 yo man with no reported PMH presents from home in setting of 3 weeks of fever and malaise with CXR notable for diffuse bilateral opacities concerning for PNA Covid+

## 2020-03-27 NOTE — PROGRESS NOTE ADULT - PROBLEM SELECTOR PLAN 1
Covid-19 POS   eligible for study - f/u ID  c/w plaquenil 5d course  abx d/c'ed  O2 mgmt as below - wean as tolerated  trend inflammatory markers q48hrs

## 2020-03-28 LAB
ALBUMIN SERPL ELPH-MCNC: 3.2 G/DL — LOW (ref 3.3–5)
ALP SERPL-CCNC: 67 U/L — SIGNIFICANT CHANGE UP (ref 40–120)
ALT FLD-CCNC: 61 U/L — HIGH (ref 10–45)
ANION GAP SERPL CALC-SCNC: 15 MMOL/L — SIGNIFICANT CHANGE UP (ref 5–17)
AST SERPL-CCNC: 91 U/L — HIGH (ref 10–40)
BASOPHILS # BLD AUTO: 0.03 K/UL — SIGNIFICANT CHANGE UP (ref 0–0.2)
BASOPHILS NFR BLD AUTO: 0.4 % — SIGNIFICANT CHANGE UP (ref 0–2)
BILIRUB SERPL-MCNC: 0.7 MG/DL — SIGNIFICANT CHANGE UP (ref 0.2–1.2)
BUN SERPL-MCNC: 11 MG/DL — SIGNIFICANT CHANGE UP (ref 7–23)
CALCIUM SERPL-MCNC: 8.4 MG/DL — SIGNIFICANT CHANGE UP (ref 8.4–10.5)
CHLORIDE SERPL-SCNC: 103 MMOL/L — SIGNIFICANT CHANGE UP (ref 96–108)
CK SERPL-CCNC: 51 U/L — SIGNIFICANT CHANGE UP (ref 30–200)
CO2 SERPL-SCNC: 19 MMOL/L — LOW (ref 22–31)
CREAT SERPL-MCNC: 0.65 MG/DL — SIGNIFICANT CHANGE UP (ref 0.5–1.3)
CRP SERPL-MCNC: 14.02 MG/DL — HIGH (ref 0–0.4)
D DIMER BLD IA.RAPID-MCNC: 321 NG/ML DDU — HIGH
EOSINOPHIL # BLD AUTO: 0.05 K/UL — SIGNIFICANT CHANGE UP (ref 0–0.5)
EOSINOPHIL NFR BLD AUTO: 0.7 % — SIGNIFICANT CHANGE UP (ref 0–6)
FERRITIN SERPL-MCNC: 1573 NG/ML — HIGH (ref 30–400)
GLUCOSE SERPL-MCNC: 101 MG/DL — HIGH (ref 70–99)
HCT VFR BLD CALC: 36.4 % — LOW (ref 39–50)
HGB BLD-MCNC: 12.3 G/DL — LOW (ref 13–17)
IMM GRANULOCYTES NFR BLD AUTO: 0.4 % — SIGNIFICANT CHANGE UP (ref 0–1.5)
LDH SERPL L TO P-CCNC: 444 U/L — HIGH (ref 50–242)
LYMPHOCYTES # BLD AUTO: 0.72 K/UL — LOW (ref 1–3.3)
LYMPHOCYTES # BLD AUTO: 10.7 % — LOW (ref 13–44)
MAGNESIUM SERPL-MCNC: 2.2 MG/DL — SIGNIFICANT CHANGE UP (ref 1.6–2.6)
MCHC RBC-ENTMCNC: 30.8 PG — SIGNIFICANT CHANGE UP (ref 27–34)
MCHC RBC-ENTMCNC: 33.8 GM/DL — SIGNIFICANT CHANGE UP (ref 32–36)
MCV RBC AUTO: 91 FL — SIGNIFICANT CHANGE UP (ref 80–100)
MONOCYTES # BLD AUTO: 0.49 K/UL — SIGNIFICANT CHANGE UP (ref 0–0.9)
MONOCYTES NFR BLD AUTO: 7.3 % — SIGNIFICANT CHANGE UP (ref 2–14)
NEUTROPHILS # BLD AUTO: 5.4 K/UL — SIGNIFICANT CHANGE UP (ref 1.8–7.4)
NEUTROPHILS NFR BLD AUTO: 80.5 % — HIGH (ref 43–77)
NRBC # BLD: 0 /100 WBCS — SIGNIFICANT CHANGE UP (ref 0–0)
PHOSPHATE SERPL-MCNC: 3.2 MG/DL — SIGNIFICANT CHANGE UP (ref 2.5–4.5)
PLATELET # BLD AUTO: 188 K/UL — SIGNIFICANT CHANGE UP (ref 150–400)
POTASSIUM SERPL-MCNC: 4.1 MMOL/L — SIGNIFICANT CHANGE UP (ref 3.5–5.3)
POTASSIUM SERPL-SCNC: 4.1 MMOL/L — SIGNIFICANT CHANGE UP (ref 3.5–5.3)
PROT SERPL-MCNC: 6.6 G/DL — SIGNIFICANT CHANGE UP (ref 6–8.3)
RBC # BLD: 4 M/UL — LOW (ref 4.2–5.8)
RBC # FLD: 13 % — SIGNIFICANT CHANGE UP (ref 10.3–14.5)
SODIUM SERPL-SCNC: 137 MMOL/L — SIGNIFICANT CHANGE UP (ref 135–145)
TROPONIN T, HIGH SENSITIVITY RESULT: 10 NG/L — SIGNIFICANT CHANGE UP (ref 0–51)
WBC # BLD: 6.72 K/UL — SIGNIFICANT CHANGE UP (ref 3.8–10.5)
WBC # FLD AUTO: 6.72 K/UL — SIGNIFICANT CHANGE UP (ref 3.8–10.5)

## 2020-03-28 PROCEDURE — 99233 SBSQ HOSP IP/OBS HIGH 50: CPT | Mod: GC

## 2020-03-28 RX ADMIN — Medication 650 MILLIGRAM(S): at 09:31

## 2020-03-28 RX ADMIN — Medication 200 MILLIGRAM(S): at 05:43

## 2020-03-28 RX ADMIN — Medication 650 MILLIGRAM(S): at 00:50

## 2020-03-28 RX ADMIN — ALBUTEROL 2 PUFF(S): 90 AEROSOL, METERED ORAL at 17:40

## 2020-03-28 RX ADMIN — Medication 650 MILLIGRAM(S): at 09:01

## 2020-03-28 RX ADMIN — ENOXAPARIN SODIUM 40 MILLIGRAM(S): 100 INJECTION SUBCUTANEOUS at 12:42

## 2020-03-28 RX ADMIN — Medication 650 MILLIGRAM(S): at 20:00

## 2020-03-28 RX ADMIN — Medication 200 MILLIGRAM(S): at 17:39

## 2020-03-28 NOTE — PROGRESS NOTE ADULT - PROBLEM SELECTOR PLAN 1
Acute Hypoxic Respiratory Failure, Viral Pneumonia, COVID 19 infection, Sepsis  - Plaquenil Protocol  - Supplemental Oxygen  - Supportive Care  - Avoid any NSAIDs or Steroids  - Albuterol PRN

## 2020-03-29 LAB
ALBUMIN SERPL ELPH-MCNC: 2.8 G/DL — LOW (ref 3.3–5)
ALP SERPL-CCNC: 79 U/L — SIGNIFICANT CHANGE UP (ref 40–120)
ALT FLD-CCNC: 85 U/L — HIGH (ref 10–45)
ANION GAP SERPL CALC-SCNC: 13 MMOL/L — SIGNIFICANT CHANGE UP (ref 5–17)
AST SERPL-CCNC: 76 U/L — HIGH (ref 10–40)
BASOPHILS # BLD AUTO: 0.02 K/UL — SIGNIFICANT CHANGE UP (ref 0–0.2)
BASOPHILS NFR BLD AUTO: 0.3 % — SIGNIFICANT CHANGE UP (ref 0–2)
BILIRUB SERPL-MCNC: 0.8 MG/DL — SIGNIFICANT CHANGE UP (ref 0.2–1.2)
BUN SERPL-MCNC: 12 MG/DL — SIGNIFICANT CHANGE UP (ref 7–23)
CALCIUM SERPL-MCNC: 8.2 MG/DL — LOW (ref 8.4–10.5)
CHLORIDE SERPL-SCNC: 103 MMOL/L — SIGNIFICANT CHANGE UP (ref 96–108)
CO2 SERPL-SCNC: 19 MMOL/L — LOW (ref 22–31)
CREAT SERPL-MCNC: 0.65 MG/DL — SIGNIFICANT CHANGE UP (ref 0.5–1.3)
EOSINOPHIL # BLD AUTO: 0.16 K/UL — SIGNIFICANT CHANGE UP (ref 0–0.5)
EOSINOPHIL NFR BLD AUTO: 2.5 % — SIGNIFICANT CHANGE UP (ref 0–6)
GLUCOSE SERPL-MCNC: 106 MG/DL — HIGH (ref 70–99)
HCT VFR BLD CALC: 34.7 % — LOW (ref 39–50)
HGB BLD-MCNC: 12.1 G/DL — LOW (ref 13–17)
IMM GRANULOCYTES NFR BLD AUTO: 0.8 % — SIGNIFICANT CHANGE UP (ref 0–1.5)
LYMPHOCYTES # BLD AUTO: 0.63 K/UL — LOW (ref 1–3.3)
LYMPHOCYTES # BLD AUTO: 10 % — LOW (ref 13–44)
MAGNESIUM SERPL-MCNC: 2.2 MG/DL — SIGNIFICANT CHANGE UP (ref 1.6–2.6)
MCHC RBC-ENTMCNC: 31.7 PG — SIGNIFICANT CHANGE UP (ref 27–34)
MCHC RBC-ENTMCNC: 34.9 GM/DL — SIGNIFICANT CHANGE UP (ref 32–36)
MCV RBC AUTO: 90.8 FL — SIGNIFICANT CHANGE UP (ref 80–100)
MONOCYTES # BLD AUTO: 0.54 K/UL — SIGNIFICANT CHANGE UP (ref 0–0.9)
MONOCYTES NFR BLD AUTO: 8.6 % — SIGNIFICANT CHANGE UP (ref 2–14)
NEUTROPHILS # BLD AUTO: 4.89 K/UL — SIGNIFICANT CHANGE UP (ref 1.8–7.4)
NEUTROPHILS NFR BLD AUTO: 77.8 % — HIGH (ref 43–77)
NRBC # BLD: 0 /100 WBCS — SIGNIFICANT CHANGE UP (ref 0–0)
PHOSPHATE SERPL-MCNC: 3 MG/DL — SIGNIFICANT CHANGE UP (ref 2.5–4.5)
PLATELET # BLD AUTO: 231 K/UL — SIGNIFICANT CHANGE UP (ref 150–400)
POTASSIUM SERPL-MCNC: 4.2 MMOL/L — SIGNIFICANT CHANGE UP (ref 3.5–5.3)
POTASSIUM SERPL-SCNC: 4.2 MMOL/L — SIGNIFICANT CHANGE UP (ref 3.5–5.3)
PROT SERPL-MCNC: 6.8 G/DL — SIGNIFICANT CHANGE UP (ref 6–8.3)
RBC # BLD: 3.82 M/UL — LOW (ref 4.2–5.8)
RBC # FLD: 12.9 % — SIGNIFICANT CHANGE UP (ref 10.3–14.5)
SODIUM SERPL-SCNC: 135 MMOL/L — SIGNIFICANT CHANGE UP (ref 135–145)
WBC # BLD: 6.29 K/UL — SIGNIFICANT CHANGE UP (ref 3.8–10.5)
WBC # FLD AUTO: 6.29 K/UL — SIGNIFICANT CHANGE UP (ref 3.8–10.5)

## 2020-03-29 PROCEDURE — 99233 SBSQ HOSP IP/OBS HIGH 50: CPT | Mod: GC

## 2020-03-29 RX ORDER — FUROSEMIDE 40 MG
20 TABLET ORAL ONCE
Refills: 0 | Status: COMPLETED | OUTPATIENT
Start: 2020-03-29 | End: 2020-03-29

## 2020-03-29 RX ORDER — ALBUTEROL 90 UG/1
2 AEROSOL, METERED ORAL EVERY 6 HOURS
Refills: 0 | Status: DISCONTINUED | OUTPATIENT
Start: 2020-03-29 | End: 2020-04-03

## 2020-03-29 RX ADMIN — ALBUTEROL 2 PUFF(S): 90 AEROSOL, METERED ORAL at 18:02

## 2020-03-29 RX ADMIN — ENOXAPARIN SODIUM 40 MILLIGRAM(S): 100 INJECTION SUBCUTANEOUS at 12:28

## 2020-03-29 RX ADMIN — Medication 200 MILLIGRAM(S): at 16:21

## 2020-03-29 RX ADMIN — ALBUTEROL 2 PUFF(S): 90 AEROSOL, METERED ORAL at 12:29

## 2020-03-29 RX ADMIN — Medication 200 MILLIGRAM(S): at 05:42

## 2020-03-29 RX ADMIN — Medication 20 MILLIGRAM(S): at 16:22

## 2020-03-29 NOTE — PROGRESS NOTE ADULT - PROBLEM SELECTOR PLAN 1
Acute Hypoxic Respiratory Failure, Viral Pneumonia, COVID 19 infection, Sepsis  - Plaquenil Protocol  - Supplemental Oxygen - will titrate down to 2L NC today.   - Supportive Care  - Avoid any NSAIDs or Steroids  - Albuterol standing  - CRP is uptrending today from 9.35 to 14.02

## 2020-03-29 NOTE — PROGRESS NOTE ADULT - SUBJECTIVE AND OBJECTIVE BOX
[facility] Division of Hospital Medicine  Arvin OSMANJr Reyes  Pager:[phone]    Patient is a 79y old  Male who presents with a chief complaint of fever (28 Mar 2020 16:55)    I spoke with patient in our mutually shared language of Arabic.    SUBJECTIVE / OVERNIGHT EVENTS:    pt c/o exertional dyspnea but reports that breathing is comfortable at rest.   pt states that he is feeling a tad better today    ROS: ( - ) Fever, ( - )Chills,  ( - )Nausea/Vomiting, ( + ) Cough, ( + )Shortness of breath, ( - )Chest Pain      ADDITIONAL REVIEW OF SYSTEMS:    MEDICATIONS  (STANDING):  ALBUTerol    90 MICROgram(s) HFA Inhaler 2 Puff(s) Inhalation every 6 hours  enoxaparin Injectable 40 milliGRAM(s) SubCutaneous every 24 hours  hydroxychloroquine   Oral   hydroxychloroquine 200 milliGRAM(s) Oral every 12 hours  influenza   Vaccine 0.5 milliLiter(s) IntraMuscular once    MEDICATIONS  (PRN):  acetaminophen   Tablet .. 650 milliGRAM(s) Oral every 6 hours PRN Temp greater or equal to 38C (100.4F), Mild Pain (1 - 3)      T(C): 37.6 (03-29 @ 16:29), Max: 38 (03-28 @ 19:40)   HR: 77   BP: 129/64   RR: 22   SpO2: 93%    PHYSICAL EXAM:    CONSTITUTIONAL: NAD, well-developed, well-groomed  RESPIRATORY: b/l basilar rales, right anterior rales and wheezing   CARDIOVASCULAR: Regular rate and rhythm, normal S1 and S2, no murmur/rub/gallop; No lower extremity edema; Peripheral pulses are 2+ bilaterally  ABDOMEN: Nontender to palpation, normoactive bowel sounds, no rebound/guarding; No hepatosplenomegaly  MUSCULOSKELETAL:  Normal gait; no clubbing or cyanosis of digits; no joint swelling or tenderness to palpation  PSYCH: A+O to person, place, and time; affect appropriate  NEUROLOGY: CN 2-12 are intact and symmetric; no gross sensory deficits   SKIN: No rashes; no palpable lesions    I&O's Summary    28 Mar 2020 07:01  -  29 Mar 2020 07:00  --------------------------------------------------------  IN: 1080 mL / OUT: 1720 mL / NET: -640 mL    29 Mar 2020 07:01  -  29 Mar 2020 17:22  --------------------------------------------------------  IN: 440 mL / OUT: 500 mL / NET: -60 mL        LABS:                        12.1   6.29  )-----------( 231      ( 29 Mar 2020 06:04 )             34.7     03-29    135  |  103  |  12  ----------------------------<  106<H>  4.2   |  19<L>  |  0.65    Ca    8.2<L>      29 Mar 2020 06:04  Phos  3.0     03-29  Mg     2.2     03-29    TPro  6.8  /  Alb  2.8<L>  /  TBili  0.8  /  DBili  x   /  AST  76<H>  /  ALT  85<H>  /  AlkPhos  79  03-29      CARDIAC MARKERS ( 28 Mar 2020 06:45 )  x     / x     / 51 U/L / x     / x              CAPILLARY BLOOD GLUCOSE          RADIOLOGY & ADDITIONAL TESTS:  Results Reviewed:   Imaging Personally Reviewed:  Electrocardiogram Personally Reviewed:    COORDINATION OF CARE:  Care Discussed with Consultants/Other Providers [Y/N]:  Prior or Outpatient Records Reviewed [Y/N]:

## 2020-03-30 LAB
ALBUMIN SERPL ELPH-MCNC: 2.9 G/DL — LOW (ref 3.3–5)
ALP SERPL-CCNC: 110 U/L — SIGNIFICANT CHANGE UP (ref 40–120)
ALT FLD-CCNC: 108 U/L — HIGH (ref 10–45)
ANION GAP SERPL CALC-SCNC: 14 MMOL/L — SIGNIFICANT CHANGE UP (ref 5–17)
AST SERPL-CCNC: 89 U/L — HIGH (ref 10–40)
BASOPHILS # BLD AUTO: 0.03 K/UL — SIGNIFICANT CHANGE UP (ref 0–0.2)
BASOPHILS NFR BLD AUTO: 0.5 % — SIGNIFICANT CHANGE UP (ref 0–2)
BILIRUB SERPL-MCNC: 0.9 MG/DL — SIGNIFICANT CHANGE UP (ref 0.2–1.2)
BUN SERPL-MCNC: 12 MG/DL — SIGNIFICANT CHANGE UP (ref 7–23)
CALCIUM SERPL-MCNC: 8.5 MG/DL — SIGNIFICANT CHANGE UP (ref 8.4–10.5)
CHLORIDE SERPL-SCNC: 101 MMOL/L — SIGNIFICANT CHANGE UP (ref 96–108)
CO2 SERPL-SCNC: 18 MMOL/L — LOW (ref 22–31)
CREAT SERPL-MCNC: 0.58 MG/DL — SIGNIFICANT CHANGE UP (ref 0.5–1.3)
CRP SERPL-MCNC: 12.56 MG/DL — HIGH (ref 0–0.4)
CULTURE RESULTS: SIGNIFICANT CHANGE UP
CULTURE RESULTS: SIGNIFICANT CHANGE UP
EOSINOPHIL # BLD AUTO: 0.22 K/UL — SIGNIFICANT CHANGE UP (ref 0–0.5)
EOSINOPHIL NFR BLD AUTO: 3.5 % — SIGNIFICANT CHANGE UP (ref 0–6)
GLUCOSE SERPL-MCNC: 102 MG/DL — HIGH (ref 70–99)
HCT VFR BLD CALC: 34.4 % — LOW (ref 39–50)
HGB BLD-MCNC: 11.7 G/DL — LOW (ref 13–17)
IMM GRANULOCYTES NFR BLD AUTO: 1.1 % — SIGNIFICANT CHANGE UP (ref 0–1.5)
LYMPHOCYTES # BLD AUTO: 0.6 K/UL — LOW (ref 1–3.3)
LYMPHOCYTES # BLD AUTO: 9.4 % — LOW (ref 13–44)
MCHC RBC-ENTMCNC: 30.9 PG — SIGNIFICANT CHANGE UP (ref 27–34)
MCHC RBC-ENTMCNC: 34 GM/DL — SIGNIFICANT CHANGE UP (ref 32–36)
MCV RBC AUTO: 90.8 FL — SIGNIFICANT CHANGE UP (ref 80–100)
MONOCYTES # BLD AUTO: 0.63 K/UL — SIGNIFICANT CHANGE UP (ref 0–0.9)
MONOCYTES NFR BLD AUTO: 9.9 % — SIGNIFICANT CHANGE UP (ref 2–14)
NEUTROPHILS # BLD AUTO: 4.82 K/UL — SIGNIFICANT CHANGE UP (ref 1.8–7.4)
NEUTROPHILS NFR BLD AUTO: 75.6 % — SIGNIFICANT CHANGE UP (ref 43–77)
NRBC # BLD: 0 /100 WBCS — SIGNIFICANT CHANGE UP (ref 0–0)
PLATELET # BLD AUTO: 280 K/UL — SIGNIFICANT CHANGE UP (ref 150–400)
POTASSIUM SERPL-MCNC: 4.3 MMOL/L — SIGNIFICANT CHANGE UP (ref 3.5–5.3)
POTASSIUM SERPL-SCNC: 4.3 MMOL/L — SIGNIFICANT CHANGE UP (ref 3.5–5.3)
PROT SERPL-MCNC: 6.6 G/DL — SIGNIFICANT CHANGE UP (ref 6–8.3)
RBC # BLD: 3.79 M/UL — LOW (ref 4.2–5.8)
RBC # FLD: 12.8 % — SIGNIFICANT CHANGE UP (ref 10.3–14.5)
SODIUM SERPL-SCNC: 133 MMOL/L — LOW (ref 135–145)
SPECIMEN SOURCE: SIGNIFICANT CHANGE UP
SPECIMEN SOURCE: SIGNIFICANT CHANGE UP
WBC # BLD: 6.37 K/UL — SIGNIFICANT CHANGE UP (ref 3.8–10.5)
WBC # FLD AUTO: 6.37 K/UL — SIGNIFICANT CHANGE UP (ref 3.8–10.5)

## 2020-03-30 RX ADMIN — Medication 200 MILLIGRAM(S): at 17:59

## 2020-03-30 RX ADMIN — ALBUTEROL 2 PUFF(S): 90 AEROSOL, METERED ORAL at 13:09

## 2020-03-30 RX ADMIN — ENOXAPARIN SODIUM 40 MILLIGRAM(S): 100 INJECTION SUBCUTANEOUS at 13:13

## 2020-03-30 RX ADMIN — ALBUTEROL 2 PUFF(S): 90 AEROSOL, METERED ORAL at 00:14

## 2020-03-30 RX ADMIN — Medication 200 MILLIGRAM(S): at 05:30

## 2020-03-30 RX ADMIN — ALBUTEROL 2 PUFF(S): 90 AEROSOL, METERED ORAL at 17:59

## 2020-03-30 RX ADMIN — ALBUTEROL 2 PUFF(S): 90 AEROSOL, METERED ORAL at 05:33

## 2020-03-30 NOTE — DIETITIAN INITIAL EVALUATION ADULT. - PERTINENT MEDS FT
MEDICATIONS  (STANDING):  ALBUTerol    90 MICROgram(s) HFA Inhaler 2 Puff(s) Inhalation every 6 hours  enoxaparin Injectable 40 milliGRAM(s) SubCutaneous every 24 hours  hydroxychloroquine   Oral   hydroxychloroquine 200 milliGRAM(s) Oral every 12 hours  influenza   Vaccine 0.5 milliLiter(s) IntraMuscular once    MEDICATIONS  (PRN):  acetaminophen   Tablet .. 650 milliGRAM(s) Oral every 6 hours PRN Temp greater or equal to 38C (100.4F), Mild Pain (1 - 3)

## 2020-03-30 NOTE — DIETITIAN INITIAL EVALUATION ADULT. - ENERGY NEEDS
HT 67 inches,  pounds- stated, Admit wt 185# likely inaccurate, BMI 22.7  Dxd with hypoxia, Covid+, PNA  Skin intact

## 2020-03-30 NOTE — PROGRESS NOTE ADULT - ASSESSMENT
78 yo man with no reported PMH presents from home in setting of 3 weeks of fever and malaise with CXR notable for diffuse bilateral opacities concerning for PNA Covid+    # Acute hypoxic respiratory failure on 4 L O2 via NC 93%,   # COVID-19 s/p Hydroxychloroquine (Plaquenil) 4/5  # Essential hypertension    Plan:  - Continue (Plaquenil)  - Dr. Peacock from ID will discuss potentially to  enter into Sarulimab trial.   - Closely monitor daily CBC, CMP, Mg, Phos, CPK  - Monitor procalcitonin, CRP, ESR, ferritin, coags, D-dimer, LDH every 2-3 days  - Avoid NSAIDs as may worsen CHAPIS and cause clinical deterioration  - Continue zinc, Vit C, and thiamine  - Closely monitor respiratory status and escalate O2 therapy as necessary to maintain SpO2>92; per institution policy, will escalate NC -> 100% NRB -> intubation  - Maintain isolation precautions with contact/airborne  - Continue Lovenox for DVT Ppx

## 2020-03-30 NOTE — DIETITIAN INITIAL EVALUATION ADULT. - OTHER INFO
Unable to conduct a face to face interview or nutrition-focused physical exam due to limited contact restrictions related to Pt's medical condition and isolation precautions. Information obtained via phone call with pt and from EMR.  Spoke with patient via  phone pacific ID # [id number]  Patient reports to be eating OK but not sleeping that well and very tired.  Patient reports to be eating small amounts here and there.  Reinforced the availability of calorie and nutrient dense snacks and Patient receptive.  Denies needed anything else.  PTA, Patient ate well at home.  Weight stable between 140-150 pounds and admit weight inaccurate at 185 pounds.  Maybe weight 145#?  Patient denies any changes.  No vitamin intake at home. Suggest multivitamin, Vit C, D and zinc.

## 2020-03-30 NOTE — DIETITIAN INITIAL EVALUATION ADULT. - ADD RECOMMEND
Regular diet, Monitor diet tolerance, po intake, GI tolerance, weight trends, labs, and skin integrity, encourage nutrient dense snacks, fluids.

## 2020-03-30 NOTE — PROGRESS NOTE ADULT - SUBJECTIVE AND OBJECTIVE BOX
Interval Events:   Still requiring oxygen 4 NC, 93%    Hospital Medications:  acetaminophen   Tablet .. 650 milliGRAM(s) Oral every 6 hours PRN  ALBUTerol    90 MICROgram(s) HFA Inhaler 2 Puff(s) Inhalation every 6 hours  enoxaparin Injectable 40 milliGRAM(s) SubCutaneous every 24 hours  hydroxychloroquine   Oral   hydroxychloroquine 200 milliGRAM(s) Oral every 12 hours  influenza   Vaccine 0.5 milliLiter(s) IntraMuscular once        ROS: deferred     PHYSICAL EXAM:   Vital Signs:  Vital Signs Last 24 Hrs  T(C): 36.8 (30 Mar 2020 14:02), Max: 37.7 (29 Mar 2020 21:50)  T(F): 98.2 (30 Mar 2020 14:02), Max: 99.8 (29 Mar 2020 21:50)  HR: 70 (30 Mar 2020 14:02) (65 - 77)  BP: 130/65 (30 Mar 2020 14:02) (121/79 - 142/74)  BP(mean): --  RR: 20 (30 Mar 2020 14:02) (20 - 22)  SpO2: 93% (30 Mar 2020 14:02) (92% - 93%)  Daily     Daily Weight in k.7 (30 Mar 2020 09:58)    GENERAL:  NAD, Appears stated age  HEENT:  NC/AT,  conjunctivae clear and pink, sclera -anicteric  CHEST:  Normal Effort, Breath sounds clear  HEART:  RRR, S1 + S2, no murmurs  ABDOMEN:  Soft, non-tender, non-distended, normoactive bowel sounds,  no masses  EXTREMITIES:  no cyanosis or edema  SKIN:  Warm & Dry. No rash or erythema  NEURO:  Alert, oriented    LABS:                        11.7   6.37  )-----------( 280      ( 30 Mar 2020 06:27 )             34.4     Mean Cell Volume: 90.8 fl (-20 @ 06:27)        133<L>  |  101  |  12  ----------------------------<  102<H>  4.3   |  18<L>  |  0.58    Ca    8.5      30 Mar 2020 06:27  Phos  3.0       Mg     2.2         TPro  6.6  /  Alb  2.9<L>  /  TBili  0.9  /  DBili  x   /  AST  89<H>  /  ALT  108<H>  /  AlkPhos  110  03-30    LIVER FUNCTIONS - ( 30 Mar 2020 06:27 )  Alb: 2.9 g/dL / Pro: 6.6 g/dL / ALK PHOS: 110 U/L / ALT: 108 U/L / AST: 89 U/L / GGT: x                                       11.7   6.37  )-----------( 280      ( 30 Mar 2020 06:27 )             34.4                         12.1   6.29  )-----------( 231      ( 29 Mar 2020 06:04 )             34.7                         12.3   6.72  )-----------( 188      ( 28 Mar 2020 06:45 )             36.4       Imaging:

## 2020-03-30 NOTE — DIETITIAN INITIAL EVALUATION ADULT. - PROBLEM SELECTOR PLAN 2
- COVID19 PCR collected and is pending/negative with plan for infection control to determine retest/positive  - Chest imaging consistent with multifocal pneumonia  - Relative Hypoxia in the ED: Continue with supplemental oxygen with goal SpO2>92, if requires escalate to NRB and avoid BiPAP/High Flow Nasal Cannula per institution policy given risk of aerosolization  - empiric antibiotics for superimposed bacterial infection given due to clinical status  - obtain cbc with diff, CMP w/ lytes procalcitonin, ESR, CRP  - If COVID19 positive collect LDH, Ferritin, T-cell subset, CK, G6PD  - monitor EKG for QTc prolongation  - isolation precautions

## 2020-03-31 LAB
CRP SERPL-MCNC: 10.54 MG/DL — HIGH (ref 0–0.4)
FERRITIN SERPL-MCNC: 1551 NG/ML — HIGH (ref 30–400)
LDH SERPL L TO P-CCNC: 321 U/L — HIGH (ref 50–242)

## 2020-03-31 RX ORDER — LANOLIN ALCOHOL/MO/W.PET/CERES
3 CREAM (GRAM) TOPICAL ONCE
Refills: 0 | Status: COMPLETED | OUTPATIENT
Start: 2020-03-31 | End: 2020-03-31

## 2020-03-31 RX ADMIN — Medication 3 MILLIGRAM(S): at 01:39

## 2020-03-31 RX ADMIN — Medication 200 MILLIGRAM(S): at 06:52

## 2020-03-31 RX ADMIN — ENOXAPARIN SODIUM 40 MILLIGRAM(S): 100 INJECTION SUBCUTANEOUS at 13:05

## 2020-03-31 NOTE — PROGRESS NOTE ADULT - ASSESSMENT
78 yo man with no reported PMH presents from home in setting of 3 weeks of fever and malaise with CXR notable for diffuse bilateral opacities concerning for PNA Covid+    # Acute hypoxic respiratory failure on 3 L O2 via NC 93%, ( improving slowly was on 4L yesterday)  # COVID-19 s/p 5 days of Hydroxychloroquine (Plaquenil)   # Essential hypertension    Plan:  - Continue supportive care  - Closely monitor daily CBC, CMP, Mg, Phos, CPK  - Monitor procalcitonin, CRP, ESR, ferritin, coags, D-dimer, LDH every 2-3 days  - Avoid NSAIDs as may worsen CHAPIS and cause clinical deterioration  - Continue zinc, Vit C, and thiamine  - Closely monitor respiratory status and escalate O2 therapy as necessary to maintain SpO2>92; per institution policy, will escalate NC -> 100% NRB -> intubation  - Maintain isolation precautions with contact/airborne  - Continue Lovenox for DVT Ppx

## 2020-03-31 NOTE — PROGRESS NOTE ADULT - SUBJECTIVE AND OBJECTIVE BOX
Interval Events:   Still requiring oxygen supplements 3L 94%  No new complaints     Hospital Medications:  acetaminophen   Tablet .. 650 milliGRAM(s) Oral every 6 hours PRN  ALBUTerol    90 MICROgram(s) HFA Inhaler 2 Puff(s) Inhalation every 6 hours  enoxaparin Injectable 40 milliGRAM(s) SubCutaneous every 24 hours  influenza   Vaccine 0.5 milliLiter(s) IntraMuscular once        ROS: deferred    PHYSICAL EXAM:   Vital Signs:  Vital Signs Last 24 Hrs  T(C): 37.1 (31 Mar 2020 09:06), Max: 37.4 (30 Mar 2020 18:04)  T(F): 98.7 (31 Mar 2020 09:06), Max: 99.4 (30 Mar 2020 18:04)  HR: 71 (31 Mar 2020 09:06) (67 - 75)  BP: 132/67 (31 Mar 2020 09:06) (130/65 - 153/71)  BP(mean): --  RR: 20 (31 Mar 2020 09:06) (20 - 20)  SpO2: 95% (31 Mar 2020 09:06) (92% - 95%)  Daily     Daily     GENERAL:  Mild respiratory distress  HEENT:  NC/AT,  conjunctivae clear and pink, sclera -anicteric  CHEST:  tachpenic. No wheezes. Bibasilar rales  HEART:  RRR, S1 + S2,   ABDOMEN:  Soft, non-tender, non-distended, normoactive bowel sounds,  no masses  EXTREMITIES:  no cyanosis or edema  SKIN:  Warm & Dry. No rash or erythema  NEURO:  Alert, oriented    LABS:                        11.7   6.37  )-----------( 280      ( 30 Mar 2020 06:27 )             34.4       03-30    133<L>  |  101  |  12  ----------------------------<  102<H>  4.3   |  18<L>  |  0.58    Ca    8.5      30 Mar 2020 06:27    TPro  6.6  /  Alb  2.9<L>  /  TBili  0.9  /  DBili  x   /  AST  89<H>  /  ALT  108<H>  /  AlkPhos  110  03-30    LIVER FUNCTIONS - ( 30 Mar 2020 06:27 )  Alb: 2.9 g/dL / Pro: 6.6 g/dL / ALK PHOS: 110 U/L / ALT: 108 U/L / AST: 89 U/L / GGT: x                                       11.7   6.37  )-----------( 280      ( 30 Mar 2020 06:27 )             34.4                         12.1   6.29  )-----------( 231      ( 29 Mar 2020 06:04 )             34.7       Imaging:

## 2020-04-01 DIAGNOSIS — I10 ESSENTIAL (PRIMARY) HYPERTENSION: ICD-10-CM

## 2020-04-01 DIAGNOSIS — G47.00 INSOMNIA, UNSPECIFIED: ICD-10-CM

## 2020-04-01 LAB
ALBUMIN SERPL ELPH-MCNC: 3.1 G/DL — LOW (ref 3.3–5)
ALP SERPL-CCNC: 144 U/L — HIGH (ref 40–120)
ALT FLD-CCNC: 133 U/L — HIGH (ref 10–45)
ANION GAP SERPL CALC-SCNC: 14 MMOL/L — SIGNIFICANT CHANGE UP (ref 5–17)
AST SERPL-CCNC: 70 U/L — HIGH (ref 10–40)
BASOPHILS # BLD AUTO: 0.04 K/UL — SIGNIFICANT CHANGE UP (ref 0–0.2)
BASOPHILS NFR BLD AUTO: 0.5 % — SIGNIFICANT CHANGE UP (ref 0–2)
BILIRUB SERPL-MCNC: 0.6 MG/DL — SIGNIFICANT CHANGE UP (ref 0.2–1.2)
BUN SERPL-MCNC: 10 MG/DL — SIGNIFICANT CHANGE UP (ref 7–23)
CALCIUM SERPL-MCNC: 8.7 MG/DL — SIGNIFICANT CHANGE UP (ref 8.4–10.5)
CHLORIDE SERPL-SCNC: 98 MMOL/L — SIGNIFICANT CHANGE UP (ref 96–108)
CO2 SERPL-SCNC: 22 MMOL/L — SIGNIFICANT CHANGE UP (ref 22–31)
CREAT SERPL-MCNC: 0.64 MG/DL — SIGNIFICANT CHANGE UP (ref 0.5–1.3)
EOSINOPHIL # BLD AUTO: 0.24 K/UL — SIGNIFICANT CHANGE UP (ref 0–0.5)
EOSINOPHIL NFR BLD AUTO: 3.2 % — SIGNIFICANT CHANGE UP (ref 0–6)
GLUCOSE SERPL-MCNC: 155 MG/DL — HIGH (ref 70–99)
HCT VFR BLD CALC: 36.3 % — LOW (ref 39–50)
HGB BLD-MCNC: 12 G/DL — LOW (ref 13–17)
IMM GRANULOCYTES NFR BLD AUTO: 2.1 % — HIGH (ref 0–1.5)
LYMPHOCYTES # BLD AUTO: 0.67 K/UL — LOW (ref 1–3.3)
LYMPHOCYTES # BLD AUTO: 9 % — LOW (ref 13–44)
MAGNESIUM SERPL-MCNC: 2.1 MG/DL — SIGNIFICANT CHANGE UP (ref 1.6–2.6)
MCHC RBC-ENTMCNC: 30.7 PG — SIGNIFICANT CHANGE UP (ref 27–34)
MCHC RBC-ENTMCNC: 33.1 GM/DL — SIGNIFICANT CHANGE UP (ref 32–36)
MCV RBC AUTO: 92.8 FL — SIGNIFICANT CHANGE UP (ref 80–100)
MONOCYTES # BLD AUTO: 0.67 K/UL — SIGNIFICANT CHANGE UP (ref 0–0.9)
MONOCYTES NFR BLD AUTO: 9 % — SIGNIFICANT CHANGE UP (ref 2–14)
NEUTROPHILS # BLD AUTO: 5.69 K/UL — SIGNIFICANT CHANGE UP (ref 1.8–7.4)
NEUTROPHILS NFR BLD AUTO: 76.2 % — SIGNIFICANT CHANGE UP (ref 43–77)
NRBC # BLD: 0 /100 WBCS — SIGNIFICANT CHANGE UP (ref 0–0)
PHOSPHATE SERPL-MCNC: 2.8 MG/DL — SIGNIFICANT CHANGE UP (ref 2.5–4.5)
PLATELET # BLD AUTO: 324 K/UL — SIGNIFICANT CHANGE UP (ref 150–400)
POTASSIUM SERPL-MCNC: 4.2 MMOL/L — SIGNIFICANT CHANGE UP (ref 3.5–5.3)
POTASSIUM SERPL-SCNC: 4.2 MMOL/L — SIGNIFICANT CHANGE UP (ref 3.5–5.3)
PROT SERPL-MCNC: 7.1 G/DL — SIGNIFICANT CHANGE UP (ref 6–8.3)
RBC # BLD: 3.91 M/UL — LOW (ref 4.2–5.8)
RBC # FLD: 12.7 % — SIGNIFICANT CHANGE UP (ref 10.3–14.5)
SODIUM SERPL-SCNC: 134 MMOL/L — LOW (ref 135–145)
WBC # BLD: 7.47 K/UL — SIGNIFICANT CHANGE UP (ref 3.8–10.5)
WBC # FLD AUTO: 7.47 K/UL — SIGNIFICANT CHANGE UP (ref 3.8–10.5)

## 2020-04-01 PROCEDURE — 99233 SBSQ HOSP IP/OBS HIGH 50: CPT

## 2020-04-01 RX ORDER — LANOLIN ALCOHOL/MO/W.PET/CERES
3 CREAM (GRAM) TOPICAL AT BEDTIME
Refills: 0 | Status: DISCONTINUED | OUTPATIENT
Start: 2020-04-01 | End: 2020-04-03

## 2020-04-01 RX ORDER — DIPHENHYDRAMINE HCL 50 MG
50 CAPSULE ORAL ONCE
Refills: 0 | Status: DISCONTINUED | OUTPATIENT
Start: 2020-04-01 | End: 2020-04-03

## 2020-04-01 RX ORDER — LOPERAMIDE HCL 2 MG
2 TABLET ORAL ONCE
Refills: 0 | Status: COMPLETED | OUTPATIENT
Start: 2020-04-01 | End: 2020-04-02

## 2020-04-01 RX ORDER — EPINEPHRINE 0.3 MG/.3ML
0.3 INJECTION INTRAMUSCULAR; SUBCUTANEOUS ONCE
Refills: 0 | Status: DISCONTINUED | OUTPATIENT
Start: 2020-04-01 | End: 2020-04-03

## 2020-04-01 RX ADMIN — ENOXAPARIN SODIUM 40 MILLIGRAM(S): 100 INJECTION SUBCUTANEOUS at 11:42

## 2020-04-01 NOTE — CHART NOTE - NSCHARTNOTEFT_GEN_A_CORE
Clinical trial enrollment  category severe  no steroids  consent obtained via language line  ([id number]) from daughter Ludy

## 2020-04-01 NOTE — PROGRESS NOTE ADULT - SUBJECTIVE AND OBJECTIVE BOX
Patient is a 79y old  Male who presents with a chief complaint of fever (31 Mar 2020 10:55)      SUBJECTIVE / OVERNIGHT EVENTS:  SPO2= 90-94L NC   NOw on RA       ADDITIONAL REVIEW OF SYSTEMS: Negative except for above    MEDICATIONS  (STANDING):  ALBUTerol    90 MICROgram(s) HFA Inhaler 2 Puff(s) Inhalation every 6 hours  diphenhydrAMINE   Injectable 50 milliGRAM(s) IV Push once  enoxaparin Injectable 40 milliGRAM(s) SubCutaneous every 24 hours  EPINEPHrine    Injectable 0.3 milliGRAM(s) IntraMuscular once  influenza   Vaccine 0.5 milliLiter(s) IntraMuscular once  investigational medication - IVPB 200 milliGRAM(s) IV Intermittent once    MEDICATIONS  (PRN):  acetaminophen   Tablet .. 650 milliGRAM(s) Oral every 6 hours PRN Temp greater or equal to 38C (100.4F), Mild Pain (1 - 3)      CAPILLARY BLOOD GLUCOSE        I&O's Summary    31 Mar 2020 07:01  -  01 Apr 2020 07:00  --------------------------------------------------------  IN: 1200 mL / OUT: 1325 mL / NET: -125 mL    01 Apr 2020 07:01  -  01 Apr 2020 12:43  --------------------------------------------------------  IN: 240 mL / OUT: 400 mL / NET: -160 mL        PHYSICAL EXAM:  Vital Signs Last 24 Hrs  T(C): 37.1 (01 Apr 2020 12:35), Max: 37.3 (31 Mar 2020 21:30)  T(F): 98.7 (01 Apr 2020 12:35), Max: 99.1 (31 Mar 2020 21:30)  HR: 71 (01 Apr 2020 12:35) (70 - 80)  BP: 126/71 (01 Apr 2020 12:35) (121/63 - 144/78)  BP(mean): --  RR: 18 (01 Apr 2020 12:35) (18 - 30)  SpO2: 95% (01 Apr 2020 12:35) (90% - 95%)    PHYSICAL EXAM:        LABS:                        12.0   7.47  )-----------( 324      ( 01 Apr 2020 08:01 )             36.3     04-01    134<L>  |  98  |  10  ----------------------------<  155<H>  4.2   |  22  |  0.64    Ca    8.7      01 Apr 2020 08:04  Phos  2.8     04-01  Mg     2.1     04-01    TPro  7.1  /  Alb  3.1<L>  /  TBili  0.6  /  DBili  x   /  AST  70<H>  /  ALT  133<H>  /  AlkPhos  144<H>  04-01                RADIOLOGY & ADDITIONAL TESTS:    Imaging Personally Reviewed:    Electrocardiogram Personally Reviewed:    COORDINATION OF CARE:  Care Discussed with Consultants/Other Providers [Y/N]:  Prior or Outpatient Records Reviewed [Y/N]: Patient is a 79y old  Male who presents with a chief complaint of fever (31 Mar 2020 10:55)      SUBJECTIVE / OVERNIGHT EVENTS:  SPO2= 90-94L NC   NOw on RA       ADDITIONAL REVIEW OF SYSTEMS: Negative except for above    MEDICATIONS  (STANDING):  ALBUTerol    90 MICROgram(s) HFA Inhaler 2 Puff(s) Inhalation every 6 hours  diphenhydrAMINE   Injectable 50 milliGRAM(s) IV Push once  enoxaparin Injectable 40 milliGRAM(s) SubCutaneous every 24 hours  EPINEPHrine    Injectable 0.3 milliGRAM(s) IntraMuscular once  influenza   Vaccine 0.5 milliLiter(s) IntraMuscular once  investigational medication - IVPB 200 milliGRAM(s) IV Intermittent once    MEDICATIONS  (PRN):  acetaminophen   Tablet .. 650 milliGRAM(s) Oral every 6 hours PRN Temp greater or equal to 38C (100.4F), Mild Pain (1 - 3)      CAPILLARY BLOOD GLUCOSE        I&O's Summary    31 Mar 2020 07:01  -  01 Apr 2020 07:00  --------------------------------------------------------  IN: 1200 mL / OUT: 1325 mL / NET: -125 mL    01 Apr 2020 07:01  -  01 Apr 2020 12:43  --------------------------------------------------------  IN: 240 mL / OUT: 400 mL / NET: -160 mL        PHYSICAL EXAM:  Vital Signs Last 24 Hrs  T(C): 37.1 (01 Apr 2020 12:35), Max: 37.3 (31 Mar 2020 21:30)  T(F): 98.7 (01 Apr 2020 12:35), Max: 99.1 (31 Mar 2020 21:30)  HR: 71 (01 Apr 2020 12:35) (70 - 80)  BP: 126/71 (01 Apr 2020 12:35) (121/63 - 144/78)  BP(mean): --  RR: 18 (01 Apr 2020 12:35) (18 - 30)  SpO2: 95% (01 Apr 2020 12:35) (90% - 95%)    PHYSICAL EXAM:  GENERAL:  Mild respiratory distress  HEENT:  NC/AT,  conjunctivae clear and pink, sclera -anicteric  CHEST:  pos air entry B/L with no wheeze  HEART:  RRR, S1 + S2,   ABDOMEN:  Soft, non-tender, non-distended, normoactive bowel sounds,  no masses  EXTREMITIES:  no cyanosis or edema  SKIN:  Warm & Dry. No rash or erythema  NEURO:  Alert, oriented X3         LABS:                        12.0   7.47  )-----------( 324      ( 01 Apr 2020 08:01 )             36.3     04-01    134<L>  |  98  |  10  ----------------------------<  155<H>  4.2   |  22  |  0.64    Ca    8.7      01 Apr 2020 08:04  Phos  2.8     04-01  Mg     2.1     04-01    TPro  7.1  /  Alb  3.1<L>  /  TBili  0.6  /  DBili  x   /  AST  70<H>  /  ALT  133<H>  /  AlkPhos  144<H>  04-01                RADIOLOGY & ADDITIONAL TESTS:    Imaging Personally Reviewed:    Electrocardiogram Personally Reviewed:    COORDINATION OF CARE:  Care Discussed with Consultants/Other Providers [Y/N]:  Prior or Outpatient Records Reviewed [Y/N]:

## 2020-04-01 NOTE — PROGRESS NOTE ADULT - PROBLEM SELECTOR PLAN 2
- Continue supportive care  - Closely monitor daily CBC, CMP, Mg, Phos, CPK  - Monitor procalcitonin, CRP, ESR, ferritin, coags, D-dimer, LDH every 2-3 days   - s/p 5 days of Hydroxychloroquine (Plaquenil)   - Maintain isolation precautions with contact/airborne  - Continue zinc, Vit C, and thiamine - Continue supportive care  - Closely monitor daily CBC, CMP, Mg, Phos, CPK  - Monitor procalcitonin, CRP, ESR, ferritin, coags, D-dimer, LDH every 2-3 days   - s/p 5 days of Hydroxychloroquine (Plaquenil)   - Maintain isolation precautions with contact/airborne  - Continue zinc, Vit C, and thiamine  - enrolled in the regeneron trial

## 2020-04-01 NOTE — PROGRESS NOTE ADULT - ASSESSMENT
80 yo man with no reported PMH presents from home in setting of 3 weeks of fever and malaise with CXR notable for diffuse bilateral opacities concerning for PNA Covid+    # Acute hypoxic respiratory failure on 3 L O2 via NC 93%, ( improving slowly was on 4L yesterday)  # COVID-19  # Essential hypertension    Plan:    - Avoid NSAIDs as may worsen CHAPIS and cause clinical deterioration    - Closely monitor respiratory status and escalate O2 therapy as necessary to maintain SpO2>92; per institution policy, will escalate NC -> 100% NRB -> intubation  -  - Continue Lovenox for DVT Ppx 78 yo man with no reported PMH presents from home in setting of 3 weeks of fever and malaise with CXR notable for diffuse bilateral opacities concerning for PNA Covid+

## 2020-04-01 NOTE — PROGRESS NOTE ADULT - PROBLEM SELECTOR PLAN 1
NOw on RA   will ambulate pt and check PO2 on Ambulation NOw on RA   will ambulate pt and check PO2 on Ambulation in AM   MOnitor SPO2  - Closely monitor respiratory status and escalate O2 therapy as necessary to maintain SpO2>92; per institution policy, will escalate NC -> 100% NRB -> intubation

## 2020-04-02 LAB
ALBUMIN SERPL ELPH-MCNC: 3 G/DL — LOW (ref 3.3–5)
ALP SERPL-CCNC: 130 U/L — HIGH (ref 40–120)
ALT FLD-CCNC: 124 U/L — HIGH (ref 10–45)
ANION GAP SERPL CALC-SCNC: 13 MMOL/L — SIGNIFICANT CHANGE UP (ref 5–17)
AST SERPL-CCNC: 62 U/L — HIGH (ref 10–40)
BASOPHILS # BLD AUTO: 0.05 K/UL — SIGNIFICANT CHANGE UP (ref 0–0.2)
BASOPHILS NFR BLD AUTO: 1.7 % — SIGNIFICANT CHANGE UP (ref 0–2)
BILIRUB SERPL-MCNC: 0.5 MG/DL — SIGNIFICANT CHANGE UP (ref 0.2–1.2)
BUN SERPL-MCNC: 11 MG/DL — SIGNIFICANT CHANGE UP (ref 7–23)
CALCIUM SERPL-MCNC: 8.6 MG/DL — SIGNIFICANT CHANGE UP (ref 8.4–10.5)
CHLORIDE SERPL-SCNC: 101 MMOL/L — SIGNIFICANT CHANGE UP (ref 96–108)
CK SERPL-CCNC: 22 U/L — LOW (ref 30–200)
CO2 SERPL-SCNC: 20 MMOL/L — LOW (ref 22–31)
CREAT SERPL-MCNC: 0.7 MG/DL — SIGNIFICANT CHANGE UP (ref 0.5–1.3)
CRP SERPL-MCNC: 5.46 MG/DL — HIGH (ref 0–0.4)
EOSINOPHIL # BLD AUTO: 0.21 K/UL — SIGNIFICANT CHANGE UP (ref 0–0.5)
EOSINOPHIL NFR BLD AUTO: 7 % — HIGH (ref 0–6)
GLUCOSE SERPL-MCNC: 95 MG/DL — SIGNIFICANT CHANGE UP (ref 70–99)
HCT VFR BLD CALC: 32.7 % — LOW (ref 39–50)
HGB BLD-MCNC: 11.1 G/DL — LOW (ref 13–17)
LYMPHOCYTES # BLD AUTO: 0.4 K/UL — LOW (ref 1–3.3)
LYMPHOCYTES # BLD AUTO: 13.2 % — SIGNIFICANT CHANGE UP (ref 13–44)
MAGNESIUM SERPL-MCNC: 2.2 MG/DL — SIGNIFICANT CHANGE UP (ref 1.6–2.6)
MCHC RBC-ENTMCNC: 31.4 PG — SIGNIFICANT CHANGE UP (ref 27–34)
MCHC RBC-ENTMCNC: 33.9 GM/DL — SIGNIFICANT CHANGE UP (ref 32–36)
MCV RBC AUTO: 92.6 FL — SIGNIFICANT CHANGE UP (ref 80–100)
MONOCYTES # BLD AUTO: 0.13 K/UL — SIGNIFICANT CHANGE UP (ref 0–0.9)
MONOCYTES NFR BLD AUTO: 4.4 % — SIGNIFICANT CHANGE UP (ref 2–14)
NEUTROPHILS # BLD AUTO: 2.17 K/UL — SIGNIFICANT CHANGE UP (ref 1.8–7.4)
NEUTROPHILS NFR BLD AUTO: 71.9 % — SIGNIFICANT CHANGE UP (ref 43–77)
PHOSPHATE SERPL-MCNC: 3.3 MG/DL — SIGNIFICANT CHANGE UP (ref 2.5–4.5)
PLATELET # BLD AUTO: 360 K/UL — SIGNIFICANT CHANGE UP (ref 150–400)
POTASSIUM SERPL-MCNC: 4.2 MMOL/L — SIGNIFICANT CHANGE UP (ref 3.5–5.3)
POTASSIUM SERPL-SCNC: 4.2 MMOL/L — SIGNIFICANT CHANGE UP (ref 3.5–5.3)
PROT SERPL-MCNC: 6.7 G/DL — SIGNIFICANT CHANGE UP (ref 6–8.3)
RBC # BLD: 3.53 M/UL — LOW (ref 4.2–5.8)
RBC # FLD: 12.7 % — SIGNIFICANT CHANGE UP (ref 10.3–14.5)
SODIUM SERPL-SCNC: 134 MMOL/L — LOW (ref 135–145)
WBC # BLD: 3.02 K/UL — LOW (ref 3.8–10.5)
WBC # FLD AUTO: 3.02 K/UL — LOW (ref 3.8–10.5)

## 2020-04-02 PROCEDURE — 99233 SBSQ HOSP IP/OBS HIGH 50: CPT

## 2020-04-02 RX ORDER — LOPERAMIDE HCL 2 MG
2 TABLET ORAL ONCE
Refills: 0 | Status: COMPLETED | OUTPATIENT
Start: 2020-04-02 | End: 2020-04-02

## 2020-04-02 RX ADMIN — Medication 3 MILLIGRAM(S): at 21:17

## 2020-04-02 RX ADMIN — ALBUTEROL 2 PUFF(S): 90 AEROSOL, METERED ORAL at 12:07

## 2020-04-02 RX ADMIN — Medication 2 MILLIGRAM(S): at 01:00

## 2020-04-02 RX ADMIN — Medication 2 MILLIGRAM(S): at 21:17

## 2020-04-02 RX ADMIN — Medication 3 MILLIGRAM(S): at 01:00

## 2020-04-02 RX ADMIN — Medication 650 MILLIGRAM(S): at 01:00

## 2020-04-02 RX ADMIN — ALBUTEROL 2 PUFF(S): 90 AEROSOL, METERED ORAL at 17:51

## 2020-04-02 RX ADMIN — ENOXAPARIN SODIUM 40 MILLIGRAM(S): 100 INJECTION SUBCUTANEOUS at 12:07

## 2020-04-02 NOTE — PROGRESS NOTE ADULT - SUBJECTIVE AND OBJECTIVE BOX
Patient is a 79y old  Male who presents with a chief complaint of fever (01 Apr 2020 12:42)      SUBJECTIVE / OVERNIGHT EVENTS:  SPO2: 93% (02 Apr 2020 17:50) (90% - 96%)/ on RA , no cmplaints except for lack of sleep         ADDITIONAL REVIEW OF SYSTEMS: Negative except for above    MEDICATIONS  (STANDING):  ALBUTerol    90 MICROgram(s) HFA Inhaler 2 Puff(s) Inhalation every 6 hours  diphenhydrAMINE   Injectable 50 milliGRAM(s) IV Push once  enoxaparin Injectable 40 milliGRAM(s) SubCutaneous every 24 hours  EPINEPHrine    Injectable 0.3 milliGRAM(s) IntraMuscular once  influenza   Vaccine 0.5 milliLiter(s) IntraMuscular once    MEDICATIONS  (PRN):  acetaminophen   Tablet .. 650 milliGRAM(s) Oral every 6 hours PRN Temp greater or equal to 38C (100.4F), Mild Pain (1 - 3)  melatonin 3 milliGRAM(s) Oral at bedtime PRN Insomnia      CAPILLARY BLOOD GLUCOSE        I&O's Summary    01 Apr 2020 07:01  -  02 Apr 2020 07:00  --------------------------------------------------------  IN: 920 mL / OUT: 1150 mL / NET: -230 mL    02 Apr 2020 07:01  -  02 Apr 2020 18:00  --------------------------------------------------------  IN: 480 mL / OUT: 250 mL / NET: 230 mL        PHYSICAL EXAM:  Vital Signs Last 24 Hrs  T(C): 36.4 (02 Apr 2020 17:50), Max: 36.6 (02 Apr 2020 14:09)  T(F): 97.5 (02 Apr 2020 17:50), Max: 97.8 (02 Apr 2020 14:09)  HR: 62 (02 Apr 2020 17:50) (60 - 68)  BP: 129/69 (02 Apr 2020 17:50) (98/61 - 129/69)  BP(mean): --  RR: 22 (02 Apr 2020 17:50) (18 - 22)  SpO2: 93% (02 Apr 2020 17:50) (90% - 96%)    PHYSICAL EXAM:  GENERAL:  Mild respiratory distress  HEENT:  NC/AT,  conjunctivae clear and pink, sclera -anicteric  CHEST:  pos air entry B/L with no wheeze  HEART:  RRR, S1 + S2,   ABDOMEN:  Soft, non-tender, non-distended, normoactive bowel sounds,  no masses  EXTREMITIES:  no cyanosis or edema  SKIN:  Warm & Dry. No rash or erythema  NEURO:  Alert, oriented X3       LABS:                        11.1   3.02  )-----------( 360      ( 02 Apr 2020 07:02 )             32.7     04-02    134<L>  |  101  |  11  ----------------------------<  95  4.2   |  20<L>  |  0.70    Ca    8.6      02 Apr 2020 07:02  Phos  3.3     04-02  Mg     2.2     04-02    TPro  6.7  /  Alb  3.0<L>  /  TBili  0.5  /  DBili  x   /  AST  62<H>  /  ALT  124<H>  /  AlkPhos  130<H>  04-02      CARDIAC MARKERS ( 02 Apr 2020 07:02 )  x     / x     / 22 U/L / x     / x                RADIOLOGY & ADDITIONAL TESTS:    Imaging Personally Reviewed:    Electrocardiogram Personally Reviewed:    COORDINATION OF CARE:  Care Discussed with Consultants/Other Providers [Y/N]:  Prior or Outpatient Records Reviewed [Y/N]:

## 2020-04-02 NOTE — PROGRESS NOTE ADULT - PROBLEM SELECTOR PLAN 1
- Will ambulate pt and check PO2 on Ambulation  and if ok , then dc in AM if ok   with the trial   - MOnitor SPO2  - Closely monitor respiratory status and escalate O2 therapy as necessary to maintain SpO2>92; per institution policy, will escalate NC -> 100% NRB -> intubation  - patient is now on RA

## 2020-04-03 ENCOUNTER — TRANSCRIPTION ENCOUNTER (OUTPATIENT)
Age: 40
End: 2020-04-03

## 2020-04-03 VITALS
HEART RATE: 89 BPM | RESPIRATION RATE: 20 BRPM | SYSTOLIC BLOOD PRESSURE: 135 MMHG | DIASTOLIC BLOOD PRESSURE: 89 MMHG | TEMPERATURE: 98 F | OXYGEN SATURATION: 95 %

## 2020-04-03 LAB
ALBUMIN SERPL ELPH-MCNC: 3.2 G/DL — LOW (ref 3.3–5)
ALP SERPL-CCNC: 119 U/L — SIGNIFICANT CHANGE UP (ref 40–120)
ALT FLD-CCNC: 105 U/L — HIGH (ref 10–45)
ANION GAP SERPL CALC-SCNC: 13 MMOL/L — SIGNIFICANT CHANGE UP (ref 5–17)
AST SERPL-CCNC: 46 U/L — HIGH (ref 10–40)
BASOPHILS # BLD AUTO: 0.04 K/UL — SIGNIFICANT CHANGE UP (ref 0–0.2)
BASOPHILS NFR BLD AUTO: 1.3 % — SIGNIFICANT CHANGE UP (ref 0–2)
BILIRUB SERPL-MCNC: 0.4 MG/DL — SIGNIFICANT CHANGE UP (ref 0.2–1.2)
BUN SERPL-MCNC: 12 MG/DL — SIGNIFICANT CHANGE UP (ref 7–23)
CALCIUM SERPL-MCNC: 8.8 MG/DL — SIGNIFICANT CHANGE UP (ref 8.4–10.5)
CHLORIDE SERPL-SCNC: 102 MMOL/L — SIGNIFICANT CHANGE UP (ref 96–108)
CO2 SERPL-SCNC: 21 MMOL/L — LOW (ref 22–31)
CREAT SERPL-MCNC: 0.66 MG/DL — SIGNIFICANT CHANGE UP (ref 0.5–1.3)
CRP SERPL-MCNC: 2.42 MG/DL — HIGH (ref 0–0.4)
EOSINOPHIL # BLD AUTO: 0.14 K/UL — SIGNIFICANT CHANGE UP (ref 0–0.5)
EOSINOPHIL NFR BLD AUTO: 4.6 % — SIGNIFICANT CHANGE UP (ref 0–6)
FERRITIN SERPL-MCNC: 1047 NG/ML — HIGH (ref 30–400)
GLUCOSE SERPL-MCNC: 96 MG/DL — SIGNIFICANT CHANGE UP (ref 70–99)
HCT VFR BLD CALC: 35.8 % — LOW (ref 39–50)
HGB BLD-MCNC: 11.8 G/DL — LOW (ref 13–17)
IMM GRANULOCYTES NFR BLD AUTO: 1.7 % — HIGH (ref 0–1.5)
LDH SERPL L TO P-CCNC: 230 U/L — SIGNIFICANT CHANGE UP (ref 50–242)
LYMPHOCYTES # BLD AUTO: 0.6 K/UL — LOW (ref 1–3.3)
LYMPHOCYTES # BLD AUTO: 19.9 % — SIGNIFICANT CHANGE UP (ref 13–44)
MAGNESIUM SERPL-MCNC: 2.2 MG/DL — SIGNIFICANT CHANGE UP (ref 1.6–2.6)
MCHC RBC-ENTMCNC: 30.3 PG — SIGNIFICANT CHANGE UP (ref 27–34)
MCHC RBC-ENTMCNC: 33 GM/DL — SIGNIFICANT CHANGE UP (ref 32–36)
MCV RBC AUTO: 92 FL — SIGNIFICANT CHANGE UP (ref 80–100)
MONOCYTES # BLD AUTO: 0.41 K/UL — SIGNIFICANT CHANGE UP (ref 0–0.9)
MONOCYTES NFR BLD AUTO: 13.6 % — SIGNIFICANT CHANGE UP (ref 2–14)
NEUTROPHILS # BLD AUTO: 1.78 K/UL — LOW (ref 1.8–7.4)
NEUTROPHILS NFR BLD AUTO: 58.9 % — SIGNIFICANT CHANGE UP (ref 43–77)
NRBC # BLD: 0 /100 WBCS — SIGNIFICANT CHANGE UP (ref 0–0)
PHOSPHATE SERPL-MCNC: 3.2 MG/DL — SIGNIFICANT CHANGE UP (ref 2.5–4.5)
PLATELET # BLD AUTO: 351 K/UL — SIGNIFICANT CHANGE UP (ref 150–400)
POTASSIUM SERPL-MCNC: 4.4 MMOL/L — SIGNIFICANT CHANGE UP (ref 3.5–5.3)
POTASSIUM SERPL-SCNC: 4.4 MMOL/L — SIGNIFICANT CHANGE UP (ref 3.5–5.3)
PROT SERPL-MCNC: 6.3 G/DL — SIGNIFICANT CHANGE UP (ref 6–8.3)
RBC # BLD: 3.89 M/UL — LOW (ref 4.2–5.8)
RBC # FLD: 12.6 % — SIGNIFICANT CHANGE UP (ref 10.3–14.5)
SODIUM SERPL-SCNC: 136 MMOL/L — SIGNIFICANT CHANGE UP (ref 135–145)
WBC # BLD: 3.02 K/UL — LOW (ref 3.8–10.5)
WBC # FLD AUTO: 3.02 K/UL — LOW (ref 3.8–10.5)

## 2020-04-03 PROCEDURE — 71045 X-RAY EXAM CHEST 1 VIEW: CPT

## 2020-04-03 PROCEDURE — 85730 THROMBOPLASTIN TIME PARTIAL: CPT

## 2020-04-03 PROCEDURE — 83735 ASSAY OF MAGNESIUM: CPT

## 2020-04-03 PROCEDURE — 83615 LACTATE (LD) (LDH) ENZYME: CPT

## 2020-04-03 PROCEDURE — 99285 EMERGENCY DEPT VISIT HI MDM: CPT

## 2020-04-03 PROCEDURE — 94640 AIRWAY INHALATION TREATMENT: CPT

## 2020-04-03 PROCEDURE — 71250 CT THORAX DX C-: CPT

## 2020-04-03 PROCEDURE — 84145 PROCALCITONIN (PCT): CPT

## 2020-04-03 PROCEDURE — 82955 ASSAY OF G6PD ENZYME: CPT

## 2020-04-03 PROCEDURE — 80048 BASIC METABOLIC PNL TOTAL CA: CPT

## 2020-04-03 PROCEDURE — 87086 URINE CULTURE/COLONY COUNT: CPT

## 2020-04-03 PROCEDURE — 84100 ASSAY OF PHOSPHORUS: CPT

## 2020-04-03 PROCEDURE — 86140 C-REACTIVE PROTEIN: CPT

## 2020-04-03 PROCEDURE — 85610 PROTHROMBIN TIME: CPT

## 2020-04-03 PROCEDURE — 85652 RBC SED RATE AUTOMATED: CPT

## 2020-04-03 PROCEDURE — 82550 ASSAY OF CK (CPK): CPT

## 2020-04-03 PROCEDURE — 80053 COMPREHEN METABOLIC PANEL: CPT

## 2020-04-03 PROCEDURE — 81001 URINALYSIS AUTO W/SCOPE: CPT

## 2020-04-03 PROCEDURE — 82728 ASSAY OF FERRITIN: CPT

## 2020-04-03 PROCEDURE — 84484 ASSAY OF TROPONIN QUANT: CPT

## 2020-04-03 PROCEDURE — 85379 FIBRIN DEGRADATION QUANT: CPT

## 2020-04-03 PROCEDURE — 87040 BLOOD CULTURE FOR BACTERIA: CPT

## 2020-04-03 PROCEDURE — 83605 ASSAY OF LACTIC ACID: CPT

## 2020-04-03 PROCEDURE — 99239 HOSP IP/OBS DSCHRG MGMT >30: CPT

## 2020-04-03 PROCEDURE — 93005 ELECTROCARDIOGRAM TRACING: CPT

## 2020-04-03 PROCEDURE — 85027 COMPLETE CBC AUTOMATED: CPT

## 2020-04-03 RX ORDER — ALBUTEROL 90 UG/1
2 AEROSOL, METERED ORAL
Qty: 1 | Refills: 0
Start: 2020-04-03 | End: 2020-04-12

## 2020-04-03 RX ORDER — ACETAMINOPHEN 500 MG
2 TABLET ORAL
Qty: 80 | Refills: 0
Start: 2020-04-03 | End: 2020-04-12

## 2020-04-03 RX ADMIN — ENOXAPARIN SODIUM 40 MILLIGRAM(S): 100 INJECTION SUBCUTANEOUS at 13:45

## 2020-04-03 NOTE — DISCHARGE NOTE PROVIDER - HOSPITAL COURSE
80 yo man with no reported PMH presents from home in setting of 3 weeks of fever and malaise, admitted for acute hypoxic respiratory failure due to COVID19. During hospital course, patient completed 5-day course of Plaquenil, was enrolled in 1L-6 trial, noted with considerable improvement of symptoms post-treatment. Patient successfully weaned off supplemental O2, RA at rest and post-ambulation remained with spo2>90%. Patient deemed medically stable to discharge home, with plan to complete 14-day quarantine at home, will be followed up by [facility] Team for close monitoring, and follow-up with PCP post-isolation.

## 2020-04-03 NOTE — PROGRESS NOTE ADULT - PROBLEM SELECTOR PROBLEM 3
Essential hypertension
Prophylactic measure
Essential hypertension
Prophylactic measure
Essential hypertension

## 2020-04-03 NOTE — PROGRESS NOTE ADULT - ATTENDING COMMENTS
Trudy Crouch   Hospitalist   
updated son in law on phone, questions answered
Trudy Crouch   Hospitalist   
Family was updated       Trudy Crouch   Hospitalist   [phone]

## 2020-04-03 NOTE — PROGRESS NOTE ADULT - PROBLEM SELECTOR PLAN 3
BP is stable   cont to monitor
DVT ppx: Lovenox  GOC: Full Code
BP is stable   cont to monitor
DVT ppx: Lovenox  GOC: Full Code
BP is stable   cont to monitor

## 2020-04-03 NOTE — PROGRESS NOTE ADULT - PROBLEM SELECTOR PLAN 4
Once PCP is identified , will call
Transitions of Care Status:  1.  Name of PCP:  2.  PCP Contacted on Admission: [ ] Y    [ ] N    3.  PCP contacted at Discharge: [ ] Y    [ ] N    [ ] N/A  4.  Post-Discharge Appointment Date and Location:  5.  Summary of Handoff given to PCP:
Once PCP is identified , will call
Transitions of Care Status:  1.  Name of PCP:  2.  PCP Contacted on Admission: [ ] Y    [ ] N    3.  PCP contacted at Discharge: [ ] Y    [ ] N    [ ] N/A  4.  Post-Discharge Appointment Date and Location:  5.  Summary of Handoff given to PCP:
Once PCP is identified , will call

## 2020-04-03 NOTE — DISCHARGE NOTE PROVIDER - NSDCMRMEDTOKEN_GEN_ALL_CORE_FT
acetaminophen 325 mg oral tablet: 2 tab(s) orally every 6 hours, As needed, Temp greater or equal to 38C (100.4F), Mild Pain (1 - 3)  albuterol 90 mcg/inh inhalation aerosol: 2 puff(s) inhaled every 6 hours, As Needed for shortness of breath/wheezing

## 2020-04-03 NOTE — PROGRESS NOTE ADULT - PROBLEM SELECTOR PLAN 5
CW Melatonin
CW Melatonin  son in law was aware of the DC and patient is to be quarantined for 14 days at home
Start Melatonin

## 2020-04-03 NOTE — PROGRESS NOTE ADULT - PROBLEM SELECTOR PROBLEM 1
Acute respiratory failure with hypoxia
Acute respiratory failure with hypoxia
Coronavirus infection
Acute respiratory failure with hypoxia
Coronavirus infection

## 2020-04-03 NOTE — PROGRESS NOTE ADULT - ASSESSMENT
78 yo man with no reported PMH presents from home in setting of 3 weeks of fever and malaise with CXR notable for diffuse bilateral opacities concerning for PNA Covid+.  Patient has improved.

## 2020-04-03 NOTE — DISCHARGE NOTE PROVIDER - PROVIDER TOKENS
FREE:[LAST:[Follow-up with PCP after quarantine period is complete],PHONE:[(   )    -],FAX:[(   )    -],FOLLOWUP:[2 weeks],ESTABLISHEDPATIENT:[T]]

## 2020-04-03 NOTE — DISCHARGE NOTE PROVIDER - CARE PROVIDER_API CALL
Follow-up with PCP after quarantine period is complete,   Phone: (   )    -  Fax: (   )    -  Established Patient  Follow Up Time: 2 weeks

## 2020-04-03 NOTE — PROGRESS NOTE ADULT - PROBLEM SELECTOR PLAN 1
- Patient saturates well on RA   - MOnitor SPO2  - DC HOme today , DC time 45mns  - patient is now on RA  - f/up with [facility]   - f/up with PCP within 3 weeks   - patient's son in Law was updated

## 2020-04-03 NOTE — DISCHARGE NOTE PROVIDER - NSDCCPCAREPLAN_GEN_ALL_CORE_FT
PRINCIPAL DISCHARGE DIAGNOSIS  Diagnosis: Coronavirus infection  Assessment and Plan of Treatment: You tested positive for COVID 19.  You no longer require hospitalization.    You were enrolled and completed the [employer]-6 trial and have improved  Please restrict activities outside of your home except for getting medical care.  Do not go to work, school, or public areas.  Avoid using public transportation, ride-sharing, or taxis.  Separate yourself from other people and animals in your home.  Call ahead before visiting your doctor.  Wear a facemask when you are around other people. Cover your cough and sneezes.  Clean your hands often.  Avoid sharing personal household items.  Clean all frequently touched surfaces daily.   [facility] [facility] Team will be following up with you closely outpatient      SECONDARY DISCHARGE DIAGNOSES  Diagnosis: Acute respiratory failure with hypoxia  Assessment and Plan of Treatment: Resolved,   You are being prescribed an inhaler only if you have shortness of breath  you will be followed up with health [facility] team outpatient    Diagnosis: Essential hypertension  Assessment and Plan of Treatment: Low salt diet  Activity as tolerated.  Take all medication as prescribed.  Follow up with your medical doctor for routine blood pressure monitoring at your next visit.  Notify your doctor if you have any of the following symptoms:   Dizziness, Lightheadedness, Blurry vision, Headache, Chest pain, Shortness of breath

## 2020-04-03 NOTE — PROGRESS NOTE ADULT - SUBJECTIVE AND OBJECTIVE BOX
Patient is a 79y old  Male who presents with a chief complaint of fever (03 Apr 2020 08:48)      SUBJECTIVE / OVERNIGHT EVENTS:    Tele reviewed:       ADDITIONAL REVIEW OF SYSTEMS: Negative except for above    MEDICATIONS  (STANDING):  ALBUTerol    90 MICROgram(s) HFA Inhaler 2 Puff(s) Inhalation every 6 hours  diphenhydrAMINE   Injectable 50 milliGRAM(s) IV Push once  enoxaparin Injectable 40 milliGRAM(s) SubCutaneous every 24 hours  EPINEPHrine    Injectable 0.3 milliGRAM(s) IntraMuscular once  influenza   Vaccine 0.5 milliLiter(s) IntraMuscular once    MEDICATIONS  (PRN):  acetaminophen   Tablet .. 650 milliGRAM(s) Oral every 6 hours PRN Temp greater or equal to 38C (100.4F), Mild Pain (1 - 3)  melatonin 3 milliGRAM(s) Oral at bedtime PRN Insomnia      CAPILLARY BLOOD GLUCOSE        I&O's Summary    02 Apr 2020 07:01  -  03 Apr 2020 07:00  --------------------------------------------------------  IN: 1320 mL / OUT: 900 mL / NET: 420 mL    03 Apr 2020 07:01  -  03 Apr 2020 14:21  --------------------------------------------------------  IN: 240 mL / OUT: 0 mL / NET: 240 mL        PHYSICAL EXAM:  Vital Signs Last 24 Hrs  T(C): 36.2 (03 Apr 2020 11:53), Max: 36.4 (02 Apr 2020 17:50)  T(F): 97.2 (03 Apr 2020 11:53), Max: 97.5 (02 Apr 2020 17:50)  HR: 69 (03 Apr 2020 11:53) (62 - 69)  BP: 112/69 (03 Apr 2020 11:53) (112/69 - 129/69)  BP(mean): --  RR: 20 (03 Apr 2020 11:53) (20 - 22)  SpO2: 93% (03 Apr 2020 11:53) (92% - 93%)    PHYSICAL EXAM:  GENERAL: NAD, well-developed  HEAD:  Atraumatic, Normocephalic  EYES:  conjunctiva and sclera clear  NECK: Supple, No JVD  CHEST/LUNG: Clear to auscultation bilaterally; No wheeze  HEART: Regular rate and rhythm; No murmurs, rubs, or gallops  ABDOMEN: Soft, Nontender, Nondistended; Bowel sounds present  EXTREMITIES:  2+ Peripheral Pulses, No clubbing, cyanosis, or edema  PSYCH: AAOx3  NEUROLOGY: non-focal  SKIN: No rashes or lesions      LABS:                        11.8   3.02  )-----------( 351      ( 03 Apr 2020 06:43 )             35.8     04-03    136  |  102  |  12  ----------------------------<  96  4.4   |  21<L>  |  0.66    Ca    8.8      03 Apr 2020 06:43  Phos  3.2     04-03  Mg     2.2     04-03    TPro  6.3  /  Alb  3.2<L>  /  TBili  0.4  /  DBili  x   /  AST  46<H>  /  ALT  105<H>  /  AlkPhos  119  04-03      CARDIAC MARKERS ( 02 Apr 2020 07:02 )  x     / x     / 22 U/L / x     / x                RADIOLOGY & ADDITIONAL TESTS:    Imaging Personally Reviewed:    Electrocardiogram Personally Reviewed:    COORDINATION OF CARE:  Care Discussed with Consultants/Other Providers [Y/N]:  Prior or Outpatient Records Reviewed [Y/N]: Patient is a 79y old  Male who presents with a chief complaint of fever (03 Apr 2020 08:48)      SUBJECTIVE / OVERNIGHT EVENTS:  SpO2: 93% (03 Apr 2020 11:53) (92% - 93%) saturating 95% on RA .  NO dyspnea, no chest pain , no diarrhea         ADDITIONAL REVIEW OF SYSTEMS: Negative except for above    MEDICATIONS  (STANDING):  ALBUTerol    90 MICROgram(s) HFA Inhaler 2 Puff(s) Inhalation every 6 hours  diphenhydrAMINE   Injectable 50 milliGRAM(s) IV Push once  enoxaparin Injectable 40 milliGRAM(s) SubCutaneous every 24 hours  EPINEPHrine    Injectable 0.3 milliGRAM(s) IntraMuscular once  influenza   Vaccine 0.5 milliLiter(s) IntraMuscular once    MEDICATIONS  (PRN):  acetaminophen   Tablet .. 650 milliGRAM(s) Oral every 6 hours PRN Temp greater or equal to 38C (100.4F), Mild Pain (1 - 3)  melatonin 3 milliGRAM(s) Oral at bedtime PRN Insomnia      CAPILLARY BLOOD GLUCOSE        I&O's Summary    02 Apr 2020 07:01  -  03 Apr 2020 07:00  --------------------------------------------------------  IN: 1320 mL / OUT: 900 mL / NET: 420 mL    03 Apr 2020 07:01  -  03 Apr 2020 14:21  --------------------------------------------------------  IN: 240 mL / OUT: 0 mL / NET: 240 mL        PHYSICAL EXAM:  Vital Signs Last 24 Hrs  T(C): 36.2 (03 Apr 2020 11:53), Max: 36.4 (02 Apr 2020 17:50)  T(F): 97.2 (03 Apr 2020 11:53), Max: 97.5 (02 Apr 2020 17:50)  HR: 69 (03 Apr 2020 11:53) (62 - 69)  BP: 112/69 (03 Apr 2020 11:53) (112/69 - 129/69)  BP(mean): --  RR: 20 (03 Apr 2020 11:53) (20 - 22)  SpO2: 93% (03 Apr 2020 11:53) (92% - 93%)    PHYSICAL EXAM:  GENERAL: NAD, well-developed  HEAD:  Atraumatic, Normocephalic  EYES:  conjunctiva and sclera clear  NECK: Supple, No JVD  CHEST/LUNG: Clear to auscultation bilaterally; No wheeze  HEART: Regular rate and rhythm; No murmurs, rubs, or gallops  ABDOMEN: Soft, Nontender, Nondistended; Bowel sounds present  EXTREMITIES:  2+ Peripheral Pulses, No clubbing, cyanosis, or edema  PSYCH: AAOx3  NEUROLOGY: non-focal        LABS:                        11.8   3.02  )-----------( 351      ( 03 Apr 2020 06:43 )             35.8     04-03    136  |  102  |  12  ----------------------------<  96  4.4   |  21<L>  |  0.66    Ca    8.8      03 Apr 2020 06:43  Phos  3.2     04-03  Mg     2.2     04-03    TPro  6.3  /  Alb  3.2<L>  /  TBili  0.4  /  DBili  x   /  AST  46<H>  /  ALT  105<H>  /  AlkPhos  119  04-03      CARDIAC MARKERS ( 02 Apr 2020 07:02 )  x     / x     / 22 U/L / x     / x                RADIOLOGY & ADDITIONAL TESTS:    Imaging Personally Reviewed:    Electrocardiogram Personally Reviewed:    COORDINATION OF CARE:  Care Discussed with Consultants/Other Providers [Y/N]:  Prior or Outpatient Records Reviewed [Y/N]:

## 2020-04-03 NOTE — PROGRESS NOTE ADULT - PROBLEM SELECTOR PLAN 2
- Continue supportive care   - s/p 5 days of Hydroxychloroquine (Plaquenil)   - S/P enrollment in the regeneron trial  - pt clinically improves

## 2020-04-03 NOTE — DISCHARGE NOTE NURSING/CASE MANAGEMENT/SOCIAL WORK - PATIENT PORTAL LINK FT
You can access the FollowMyHealth Patient Portal offered by [facility] by registering at the following website: [email or web address]. By joining [facility]’s FollowMyHealth portal, you will also be able to view your health information using other applications (apps) compatible with our system.

## 2021-02-16 PROBLEM — Z78.9 OTHER SPECIFIED HEALTH STATUS: Chronic | Status: ACTIVE | Noted: 2020-03-24

## 2021-03-23 ENCOUNTER — LABORATORY RESULT (OUTPATIENT)
Age: 41
End: 2021-03-23

## 2021-03-23 ENCOUNTER — APPOINTMENT (OUTPATIENT)
Dept: INTERNAL MEDICINE | Facility: CLINIC | Age: 41
End: 2021-03-23

## 2021-03-23 ENCOUNTER — OUTPATIENT (OUTPATIENT)
Dept: OUTPATIENT SERVICES | Facility: HOSPITAL | Age: 41
LOS: 1 days | End: 2021-03-23
Payer: SELF-PAY

## 2021-03-23 ENCOUNTER — NON-APPOINTMENT (OUTPATIENT)
Age: 41
End: 2021-03-23

## 2021-03-23 VITALS
WEIGHT: 200 LBS | SYSTOLIC BLOOD PRESSURE: 140 MMHG | HEART RATE: 78 BPM | OXYGEN SATURATION: 97 % | BODY MASS INDEX: 33.28 KG/M2 | DIASTOLIC BLOOD PRESSURE: 80 MMHG

## 2021-03-23 DIAGNOSIS — H91.90 UNSPECIFIED HEARING LOSS, UNSPECIFIED EAR: ICD-10-CM

## 2021-03-23 DIAGNOSIS — Z00.00 ENCOUNTER FOR GENERAL ADULT MEDICAL EXAMINATION W/OUT ABNORMAL FINDINGS: ICD-10-CM

## 2021-03-23 DIAGNOSIS — Z98.890 OTHER SPECIFIED POSTPROCEDURAL STATES: Chronic | ICD-10-CM

## 2021-03-23 DIAGNOSIS — I10 ESSENTIAL (PRIMARY) HYPERTENSION: ICD-10-CM

## 2021-03-23 DIAGNOSIS — H57.10 OCULAR PAIN, UNSPECIFIED EYE: ICD-10-CM

## 2021-03-23 LAB
A1C WITH ESTIMATED AVERAGE GLUCOSE RESULT: 6.2 % — HIGH (ref 4–5.6)
ALBUMIN SERPL ELPH-MCNC: 4.3 G/DL — SIGNIFICANT CHANGE UP (ref 3.3–5)
ALP SERPL-CCNC: 63 U/L — SIGNIFICANT CHANGE UP (ref 40–120)
ALT FLD-CCNC: 24 U/L — SIGNIFICANT CHANGE UP (ref 10–45)
ANION GAP SERPL CALC-SCNC: 11 MMOL/L — SIGNIFICANT CHANGE UP (ref 5–17)
AST SERPL-CCNC: 20 U/L — SIGNIFICANT CHANGE UP (ref 10–40)
BILIRUB SERPL-MCNC: 0.9 MG/DL — SIGNIFICANT CHANGE UP (ref 0.2–1.2)
BUN SERPL-MCNC: 13 MG/DL — SIGNIFICANT CHANGE UP (ref 7–23)
CALCIUM SERPL-MCNC: 9.1 MG/DL — SIGNIFICANT CHANGE UP (ref 8.4–10.5)
CHLORIDE SERPL-SCNC: 106 MMOL/L — SIGNIFICANT CHANGE UP (ref 96–108)
CO2 SERPL-SCNC: 22 MMOL/L — SIGNIFICANT CHANGE UP (ref 22–31)
CREAT SERPL-MCNC: 0.83 MG/DL — SIGNIFICANT CHANGE UP (ref 0.5–1.3)
ESTIMATED AVERAGE GLUCOSE: 131 MG/DL — HIGH (ref 68–114)
GLUCOSE SERPL-MCNC: 112 MG/DL — HIGH (ref 70–99)
HCT VFR BLD CALC: 41.9 % — SIGNIFICANT CHANGE UP (ref 39–50)
HGB BLD-MCNC: 14 G/DL — SIGNIFICANT CHANGE UP (ref 13–17)
MCHC RBC-ENTMCNC: 31.6 PG — SIGNIFICANT CHANGE UP (ref 27–34)
MCHC RBC-ENTMCNC: 33.4 GM/DL — SIGNIFICANT CHANGE UP (ref 32–36)
MCV RBC AUTO: 94.6 FL — SIGNIFICANT CHANGE UP (ref 80–100)
PLATELET # BLD AUTO: 194 K/UL — SIGNIFICANT CHANGE UP (ref 150–400)
POTASSIUM SERPL-MCNC: 4.5 MMOL/L — SIGNIFICANT CHANGE UP (ref 3.5–5.3)
POTASSIUM SERPL-SCNC: 4.5 MMOL/L — SIGNIFICANT CHANGE UP (ref 3.5–5.3)
PROT SERPL-MCNC: 6.9 G/DL — SIGNIFICANT CHANGE UP (ref 6–8.3)
RBC # BLD: 4.43 M/UL — SIGNIFICANT CHANGE UP (ref 4.2–5.8)
RBC # FLD: 13 % — SIGNIFICANT CHANGE UP (ref 10.3–14.5)
SODIUM SERPL-SCNC: 139 MMOL/L — SIGNIFICANT CHANGE UP (ref 135–145)
T4 FREE+ TSH PNL SERPL: 1.85 UIU/ML — SIGNIFICANT CHANGE UP (ref 0.27–4.2)
WBC # BLD: 5.89 K/UL — SIGNIFICANT CHANGE UP (ref 3.8–10.5)
WBC # FLD AUTO: 5.89 K/UL — SIGNIFICANT CHANGE UP (ref 3.8–10.5)

## 2021-03-23 PROCEDURE — 80053 COMPREHEN METABOLIC PANEL: CPT

## 2021-03-23 PROCEDURE — 83036 HEMOGLOBIN GLYCOSYLATED A1C: CPT

## 2021-03-23 PROCEDURE — 85027 COMPLETE CBC AUTOMATED: CPT

## 2021-03-23 PROCEDURE — 84443 ASSAY THYROID STIM HORMONE: CPT

## 2021-03-23 PROCEDURE — G0463: CPT

## 2021-03-29 VITALS
DIASTOLIC BLOOD PRESSURE: 80 MMHG | OXYGEN SATURATION: 97 % | HEART RATE: 78 BPM | SYSTOLIC BLOOD PRESSURE: 140 MMHG | RESPIRATION RATE: 16 BRPM

## 2021-03-29 NOTE — HISTORY OF PRESENT ILLNESS
[FreeTextEntry1] : CPE [de-identified] : Patient is an 81 yo male with a hx of ventral hernia repair who presents to the clinic to establish care. Patient states that he is otherwise doing well and only takes vitamins for his heart and brain (knows that one is OTC B12, but unable to recall the name of the other vitamin). Of note, patient has endorsed a 2 month history of headaches that are diffuse and in the back of his head. He also has noticed eye pain which started around the same time. He states that he feels this all started when he was scratching his eyes about a a month ago. Denies any redness, swelling, loss of vision, amaurosis fugax.

## 2021-03-29 NOTE — PLAN
[FreeTextEntry1] : 81 yo male with a hx of a ventral hernia repair presenting for a CPE, now endorsing eye pain and difficulty hearing.\par \par #Difficult Hearing\par - Would refer for audiometry testing to assess if hearing aids would be beneficial\par \par #Eye Pain\par - Opthalmology referral\par - C/w OTC saline eye drops as needed.\par \par #HCM\par - Patient had a previous colonocopy in 2014 that was normal. Given age, no longer need to continue\par - Had a Tetanus vaccine and Pneumococcal in 2014\par - Will check screening labs as above.\par - RTC to clinic in 6 months or prn.\par - Flu vaccine deferred as patient has an appointment for a COVID-19 vaccine in April 7.\par \par Case d/w Dr. Marte.

## 2021-03-29 NOTE — REVIEW OF SYSTEMS
[Headache] : headache [Negative] : Heme/Lymph [Dizziness] : no dizziness [Fainting] : no fainting [Confusion] : no confusion [Unsteady Walk] : no ataxia [Memory Loss] : no memory loss [de-identified] : + Blurry vision

## 2021-03-31 DIAGNOSIS — H57.10 OCULAR PAIN, UNSPECIFIED EYE: ICD-10-CM

## 2021-03-31 DIAGNOSIS — H91.90 UNSPECIFIED HEARING LOSS, UNSPECIFIED EAR: ICD-10-CM

## 2021-04-20 ENCOUNTER — APPOINTMENT (OUTPATIENT)
Dept: SPEECH THERAPY | Facility: CLINIC | Age: 41
End: 2021-04-20

## 2021-04-20 ENCOUNTER — OUTPATIENT (OUTPATIENT)
Dept: OUTPATIENT SERVICES | Facility: HOSPITAL | Age: 41
LOS: 1 days | Discharge: HOME | End: 2021-04-20

## 2021-04-20 DIAGNOSIS — Z98.890 OTHER SPECIFIED POSTPROCEDURAL STATES: Chronic | ICD-10-CM

## 2021-04-29 DIAGNOSIS — H90.3 SENSORINEURAL HEARING LOSS, BILATERAL: ICD-10-CM

## 2021-06-29 ENCOUNTER — APPOINTMENT (OUTPATIENT)
Dept: OPHTHALMOLOGY | Facility: CLINIC | Age: 41
End: 2021-06-29

## 2022-09-14 NOTE — PROGRESS NOTE ADULT - PROVIDER SPECIALTY LIST ADULT
Hospitalist
Internal Medicine
Internal Medicine
No risk alerts present

## 2023-01-01 NOTE — PROGRESS NOTE ADULT - PROBLEM SELECTOR PROBLEM 2
No
Acute respiratory failure with hypoxia
Coronavirus infection
Coronavirus infection
Acute respiratory failure with hypoxia
Coronavirus infection

## 2023-06-01 NOTE — PROVIDER CONTACT NOTE (OTHER) - NAME OF MD/NP/PA/DO NOTIFIED:
Chelsey MORGAN Topical Metronidazole Pregnancy And Lactation Text: This medication is Pregnancy Category B and considered safe during pregnancy.  It is also considered safe to use while breastfeeding.

## 2024-12-23 NOTE — ED PROVIDER NOTE - CHIEF COMPLAINT
History & Physical - Short Stay  Gastroenterology      SUBJECTIVE:     Procedure: Colonoscopy    Chief Complaint/Indication for Procedure: Screening    PTA Medications   Medication Sig    aspirin (ECOTRIN) 81 MG EC tablet Take 1 tablet (81 mg total) by mouth once daily.    ferrous sulfate (FEOSOL) 325 mg (65 mg iron) Tab tablet TAKE ONE TABLET BY MOUTH EVERY DAY    magnesium gluconate (MAG-G) 27 mg magnesium (500 mg) Tab tablet Take 27 mg by mouth once.    potassium citrate 99 mg Cap Take 1 capsule by mouth once daily.    ubrogepant (UBRELVY) 100 mg tablet take 1 tablet by mouth as needed may take 2nd dose at least 2 hours after first does as needed    ammonium lactate (LAC-HYDRIN) 12 % lotion Apply 1 g topically 2 (two) times daily as needed for Dry Skin.    clobetasoL (TEMOVATE) 0.05 % external solution Apply 1 mL topically 2 (two) times daily.    cycloSPORINE (RESTASIS) 0.05 % ophthalmic emulsion Place 1 drop into both eyes 2 (two) times daily.    omega-3 fatty acids/fish oil (FISH OIL-OMEGA-3 FATTY ACIDS) 300-1,000 mg capsule Take 1 capsule by mouth once daily.       Review of patient's allergies indicates:   Allergen Reactions    Sertraline      Other reaction(s): heartburn        Past Medical History:   Diagnosis Date    Anxiety state, unspecified     Disturbance of salivary secretion     Edema     GERD (gastroesophageal reflux disease)     Goiter, unspecified     Migraines     Nonspecific elevation of levels of transaminase or lactic acid dehydrogenase (LDH)     Polyphagia(783.6)     Unspecified sleep apnea     patient denies    Varices of other sites      Past Surgical History:   Procedure Laterality Date    CERVICAL SPINE SURGERY      cage in neck     SECTION      x 2    ECHOCARDIOGRAM,TRANSESOPHAGEAL N/A 2023    Procedure: Transesophageal echo (RAYRAY) intra-procedure log documentation;  Surgeon: Thuy Jane MD;  Location: [facility]IMALLY INVASIVE CARDIOLOGY;  Service: Cardiology;   The patient is a 79y Male complaining of shortness of breath. Laterality: N/A;    ENDOMETRIAL ABLATION      OOPHORECTOMY Left     Dermoid cyst    TUBAL LIGATION       Family History   Problem Relation Name Age of Onset    Endometriosis Maternal Grandmother      Diabetes Father      Hypertension Father      Endometriosis Mother      Cancer Mother          thyroid    Hypertension Mother      Breast cancer Neg Hx      Uterine cancer Neg Hx      Cervical cancer Neg Hx      Ovarian cancer Neg Hx       Social History     Tobacco Use    Smoking status: Never    Smokeless tobacco: Never   Substance Use Topics    Alcohol use: Yes     Comment: occasionally    Drug use: No         OBJECTIVE:     Vital Signs (Most Recent)  Temp: 97.3 °F (36.3 °C) (12/23/24 0927)  Pulse: 71 (12/23/24 0927)  Resp: 16 (12/23/24 0927)  BP: 128/72 (12/23/24 0927)  SpO2: 99 % (12/23/24 0927)    Physical Exam:                                                       GENERAL:  Comfortable, in no acute distress.                                 HEENT EXAM:  Nonicteric.  No adenopathy.  Oropharynx is clear.               NECK:  Supple.                                                               LUNGS:  Clear.                                                               CARDIAC:  Regular rate and rhythm.  S1, S2.  No murmur.                      ABDOMEN:  Soft, positive bowel sounds, nontender.  No hepatosplenomegaly or masses.  No rebound or guarding.                                             EXTREMITIES:  No edema.     MENTAL STATUS:  Normal, alert and oriented.      ASSESSMENT/PLAN:     Assessment: Screening    Plan: Colonoscopy    Anesthesia Plan: MAC    ASA Grade: ASA 2 - Patient with mild systemic disease with no functional limitations    MALLAMPATI SCORE:  I (soft palate, uvula, fauces, and tonsillar pillars visible)       The patient is a 79y Male complaining of fever